# Patient Record
Sex: MALE | Race: WHITE | NOT HISPANIC OR LATINO | Employment: FULL TIME
[De-identification: names, ages, dates, MRNs, and addresses within clinical notes are randomized per-mention and may not be internally consistent; named-entity substitution may affect disease eponyms.]

---

## 2017-03-08 PROBLEM — I73.9 PVD (PERIPHERAL VASCULAR DISEASE) (HCC): Status: ACTIVE | Noted: 2017-03-08

## 2018-12-10 PROBLEM — I50.22 CHRONIC SYSTOLIC CHF (CONGESTIVE HEART FAILURE) (HCC): Status: ACTIVE | Noted: 2018-12-10

## 2018-12-10 PROBLEM — F10.20 ALCOHOLISM (HCC): Status: ACTIVE | Noted: 2018-12-10

## 2018-12-10 PROBLEM — I27.20 PULMONARY HTN (HCC): Status: ACTIVE | Noted: 2018-12-10

## 2018-12-10 PROBLEM — I48.0 PAF (PAROXYSMAL ATRIAL FIBRILLATION) (HCC): Status: ACTIVE | Noted: 2018-12-10

## 2018-12-10 PROBLEM — J44.9 COPD (CHRONIC OBSTRUCTIVE PULMONARY DISEASE) (HCC): Status: ACTIVE | Noted: 2018-12-10

## 2018-12-10 PROBLEM — Z72.0 TOBACCO ABUSE: Status: ACTIVE | Noted: 2018-12-10

## 2018-12-10 PROBLEM — E78.00 PURE HYPERCHOLESTEROLEMIA: Status: ACTIVE | Noted: 2018-12-10

## 2018-12-10 PROBLEM — I25.10 CORONARY ARTERY DISEASE INVOLVING NATIVE CORONARY ARTERY OF NATIVE HEART WITHOUT ANGINA PECTORIS: Status: ACTIVE | Noted: 2018-12-10

## 2018-12-10 PROBLEM — I10 BENIGN ESSENTIAL HTN: Status: ACTIVE | Noted: 2018-12-10

## 2018-12-10 PROBLEM — I77.9 BILATERAL CAROTID ARTERY DISEASE (HCC): Status: ACTIVE | Noted: 2018-12-10

## 2018-12-28 PROCEDURE — 82043 UR ALBUMIN QUANTITATIVE: CPT | Performed by: FAMILY MEDICINE

## 2018-12-28 PROCEDURE — 82570 ASSAY OF URINE CREATININE: CPT | Performed by: FAMILY MEDICINE

## 2019-04-25 PROBLEM — R94.31 ABNORMAL EKG: Status: ACTIVE | Noted: 2019-04-25

## 2019-04-25 PROBLEM — R06.00 DYSPNEA ON EXERTION: Status: ACTIVE | Noted: 2019-04-25

## 2019-05-17 ENCOUNTER — DIAGNOSTIC TRANS (OUTPATIENT)
Dept: OTHER | Age: 64
End: 2019-05-17

## 2019-05-17 ENCOUNTER — HOSPITAL (OUTPATIENT)
Dept: OTHER | Age: 64
End: 2019-05-17
Attending: HOSPITALIST

## 2019-05-17 LAB
ANALYZER ANC (IANC): ABNORMAL
ANION GAP SERPL CALC-SCNC: 12 MMOL/L (ref 10–20)
APTT PPP: 30 SECONDS (ref 22–32)
APTT PPP: NORMAL S
BASOPHILS # BLD: 0.1 THOUSAND/MCL (ref 0–0.3)
BASOPHILS NFR BLD: 1 %
BUN SERPL-MCNC: 22 MG/DL (ref 6–20)
BUN/CREAT SERPL: 20 (ref 7–25)
CALCIUM SERPL-MCNC: 9.3 MG/DL (ref 8.4–10.2)
CHLORIDE: 102 MMOL/L (ref 98–107)
CO2 SERPL-SCNC: 27 MMOL/L (ref 21–32)
CREAT SERPL-MCNC: 1.1 MG/DL (ref 0.67–1.17)
D DIMER PPP FEU-MCNC: 2.03 MG/L FEU
DIFFERENTIAL METHOD BLD: ABNORMAL
EOSINOPHIL # BLD: 0 THOUSAND/MCL (ref 0.1–0.5)
EOSINOPHIL NFR BLD: 0 %
ERYTHROCYTE [DISTWIDTH] IN BLOOD: 18.4 % (ref 11–15)
GLUCOSE SERPL-MCNC: 93 MG/DL (ref 65–99)
HEMATOCRIT: 53.2 % (ref 39–51)
HGB BLD-MCNC: 16.9 GM/DL (ref 13–17)
IMM GRANULOCYTES # BLD AUTO: 0 THOUSAND/MCL (ref 0–0.2)
IMM GRANULOCYTES NFR BLD: 0 %
INR PPP: 1.4
LYMPHOCYTES # BLD: 1.1 THOUSAND/MCL (ref 1–4)
LYMPHOCYTES NFR BLD: 21 %
MAGNESIUM SERPL-MCNC: 2.2 MG/DL (ref 1.7–2.4)
MCH RBC QN AUTO: 28.6 PG (ref 26–34)
MCHC RBC AUTO-ENTMCNC: 31.8 GM/DL (ref 32–36.5)
MCV RBC AUTO: 90 FL (ref 78–100)
MONOCYTES # BLD: 0.8 THOUSAND/MCL (ref 0.3–0.9)
MONOCYTES NFR BLD: 15 %
NEUTROPHILS # BLD: 3.3 THOUSAND/MCL (ref 1.8–7.7)
NEUTROPHILS NFR BLD: 63 %
NEUTS SEG NFR BLD: ABNORMAL %
NRBC (NRBCRE): 0 /100 WBC
NT-PROBNP SERPL-MCNC: 8032 PG/ML
PLATELET # BLD: 155 THOUSAND/MCL (ref 140–450)
POTASSIUM SERPL-SCNC: 4.4 MMOL/L (ref 3.4–5.1)
PROTHROMBIN TIME: 14.5 SECONDS (ref 9.7–11.8)
PROTHROMBIN TIME: ABNORMAL
RBC # BLD: 5.91 MILLION/MCL (ref 4.5–5.9)
SODIUM SERPL-SCNC: 137 MMOL/L (ref 135–145)
TROPONIN I SERPL HS-MCNC: 0.1 NG/ML
WBC # BLD: 5.3 THOUSAND/MCL (ref 4.2–11)

## 2019-05-18 ENCOUNTER — HOSPITAL (OUTPATIENT)
Dept: OTHER | Age: 64
End: 2019-05-18

## 2019-05-18 LAB
ANION GAP SERPL CALC-SCNC: 10 MMOL/L (ref 10–20)
BUN SERPL-MCNC: 27 MG/DL (ref 6–20)
BUN/CREAT SERPL: 22 (ref 7–25)
CALCIUM SERPL-MCNC: 9 MG/DL (ref 8.4–10.2)
CHLORIDE: 99 MMOL/L (ref 98–107)
CO2 SERPL-SCNC: 29 MMOL/L (ref 21–32)
CREAT SERPL-MCNC: 1.21 MG/DL (ref 0.67–1.17)
GLUCOSE SERPL-MCNC: 131 MG/DL (ref 65–99)
POTASSIUM SERPL-SCNC: 4.4 MMOL/L (ref 3.4–5.1)
SODIUM SERPL-SCNC: 134 MMOL/L (ref 135–145)
TROPONIN I SERPL HS-MCNC: 0.07 NG/ML

## 2019-05-19 LAB
ANALYZER ANC (IANC): ABNORMAL
ANION GAP SERPL CALC-SCNC: 10 MMOL/L (ref 10–20)
BASOPHILS # BLD: 0.1 THOUSAND/MCL (ref 0–0.3)
BASOPHILS NFR BLD: 1 %
BUN SERPL-MCNC: 27 MG/DL (ref 6–20)
BUN/CREAT SERPL: 23 (ref 7–25)
CALCIUM SERPL-MCNC: 9.2 MG/DL (ref 8.4–10.2)
CHLORIDE: 101 MMOL/L (ref 98–107)
CO2 SERPL-SCNC: 30 MMOL/L (ref 21–32)
CREAT SERPL-MCNC: 1.19 MG/DL (ref 0.67–1.17)
DIFFERENTIAL METHOD BLD: ABNORMAL
EOSINOPHIL # BLD: 0.1 THOUSAND/MCL (ref 0.1–0.5)
EOSINOPHIL NFR BLD: 1 %
ERYTHROCYTE [DISTWIDTH] IN BLOOD: 18.1 % (ref 11–15)
GLUCOSE SERPL-MCNC: 104 MG/DL (ref 65–99)
HEMATOCRIT: 50.9 % (ref 39–51)
HGB BLD-MCNC: 16.9 GM/DL (ref 13–17)
IMM GRANULOCYTES # BLD AUTO: 0 THOUSAND/MCL (ref 0–0.2)
IMM GRANULOCYTES NFR BLD: 0 %
LYMPHOCYTES # BLD: 1.3 THOUSAND/MCL (ref 1–4)
LYMPHOCYTES NFR BLD: 17 %
MAGNESIUM SERPL-MCNC: 2.3 MG/DL (ref 1.7–2.4)
MCH RBC QN AUTO: 29.5 PG (ref 26–34)
MCHC RBC AUTO-ENTMCNC: 33.2 GM/DL (ref 32–36.5)
MCV RBC AUTO: 89 FL (ref 78–100)
MONOCYTES # BLD: 0.7 THOUSAND/MCL (ref 0.3–0.9)
MONOCYTES NFR BLD: 10 %
NEUTROPHILS # BLD: 5.5 THOUSAND/MCL (ref 1.8–7.7)
NEUTROPHILS NFR BLD: 71 %
NEUTS SEG NFR BLD: ABNORMAL %
NRBC (NRBCRE): 0 /100 WBC
PLATELET # BLD: 153 THOUSAND/MCL (ref 140–450)
POTASSIUM SERPL-SCNC: 3.6 MMOL/L (ref 3.4–5.1)
RBC # BLD: 5.72 MILLION/MCL (ref 4.5–5.9)
SODIUM SERPL-SCNC: 137 MMOL/L (ref 135–145)
WBC # BLD: 7.6 THOUSAND/MCL (ref 4.2–11)

## 2019-05-20 LAB
ANALYZER ANC (IANC): ABNORMAL
APTT PPP: 29 SECONDS (ref 22–32)
APTT PPP: NORMAL S
CREAT SERPL-MCNC: 1.02 MG/DL (ref 0.67–1.17)
ERYTHROCYTE [DISTWIDTH] IN BLOOD: 18.6 % (ref 11–15)
HEMATOCRIT: 52.3 % (ref 39–51)
HGB BLD-MCNC: 17.1 GM/DL (ref 13–17)
INR PPP: 1.4
MCH RBC QN AUTO: 28.7 PG (ref 26–34)
MCHC RBC AUTO-ENTMCNC: 32.7 GM/DL (ref 32–36.5)
MCV RBC AUTO: 87.9 FL (ref 78–100)
NRBC (NRBCRE): 0 /100 WBC
PLATELET # BLD: 119 THOUSAND/MCL (ref 140–450)
POTASSIUM SERPL-SCNC: 4.4 MMOL/L (ref 3.4–5.1)
PROTHROMBIN TIME: 14.2 SECONDS (ref 9.7–11.8)
PROTHROMBIN TIME: ABNORMAL
RBC # BLD: 5.95 MILLION/MCL (ref 4.5–5.9)
WBC # BLD: 5.8 THOUSAND/MCL (ref 4.2–11)

## 2019-05-21 LAB
ANALYZER ANC (IANC): ABNORMAL
ANION GAP SERPL CALC-SCNC: 10 MMOL/L (ref 10–20)
APTT PPP: 39 SECONDS (ref 22–32)
APTT PPP: 41 SECONDS (ref 22–32)
APTT PPP: ABNORMAL S
APTT PPP: ABNORMAL S
BUN SERPL-MCNC: 27 MG/DL (ref 6–20)
BUN/CREAT SERPL: 29 (ref 7–25)
CALCIUM SERPL-MCNC: 8.3 MG/DL (ref 8.4–10.2)
CHLORIDE: 99 MMOL/L (ref 98–107)
CO2 SERPL-SCNC: 31 MMOL/L (ref 21–32)
CREAT SERPL-MCNC: 0.93 MG/DL (ref 0.67–1.17)
CREAT SERPL-MCNC: 1.06 MG/DL (ref 0.67–1.17)
ERYTHROCYTE [DISTWIDTH] IN BLOOD: 17.8 % (ref 11–15)
GLUCOSE BLDC GLUCOMTR-MCNC: 100 MG/DL (ref 65–99)
GLUCOSE SERPL-MCNC: 132 MG/DL (ref 65–99)
HEMATOCRIT: 48.3 % (ref 39–51)
HGB BLD-MCNC: 16 GM/DL (ref 13–17)
MAGNESIUM SERPL-MCNC: 2.2 MG/DL (ref 1.7–2.4)
MCH RBC QN AUTO: 29 PG (ref 26–34)
MCHC RBC AUTO-ENTMCNC: 33.1 GM/DL (ref 32–36.5)
MCV RBC AUTO: 87.7 FL (ref 78–100)
NRBC (NRBCRE): 0 /100 WBC
PHOSPHATE SERPL-MCNC: 3.6 MG/DL (ref 2.4–4.7)
PLATELET # BLD: 120 THOUSAND/MCL (ref 140–450)
POTASSIUM SERPL-SCNC: 3.5 MMOL/L (ref 3.4–5.1)
POTASSIUM SERPL-SCNC: 3.5 MMOL/L (ref 3.4–5.1)
RBC # BLD: 5.51 MILLION/MCL (ref 4.5–5.9)
SODIUM SERPL-SCNC: 136 MMOL/L (ref 135–145)
WBC # BLD: 6.4 THOUSAND/MCL (ref 4.2–11)

## 2019-05-28 ENCOUNTER — HOSPITAL (OUTPATIENT)
Dept: OTHER | Age: 64
End: 2019-05-28

## 2019-07-09 PROBLEM — N18.30 CKD (CHRONIC KIDNEY DISEASE) STAGE 3, GFR 30-59 ML/MIN (HCC): Status: ACTIVE | Noted: 2019-07-09

## 2019-07-09 PROBLEM — I65.23 BILATERAL CAROTID ARTERY STENOSIS: Status: ACTIVE | Noted: 2019-07-09

## 2019-07-09 PROBLEM — Z95.810 ICD (IMPLANTABLE CARDIOVERTER-DEFIBRILLATOR) IN PLACE: Status: ACTIVE | Noted: 2019-07-09

## 2019-09-11 PROBLEM — Z91.19 NON-COMPLIANCE: Status: ACTIVE | Noted: 2019-09-11

## 2019-10-10 PROBLEM — Z91.19 NONCOMPLIANCE: Status: ACTIVE | Noted: 2019-09-11

## 2020-07-14 PROBLEM — I34.0 MR (MITRAL REGURGITATION): Status: ACTIVE | Noted: 2020-07-14

## 2020-07-14 PROBLEM — I13.10 CARDIORENAL SYNDROME: Status: ACTIVE | Noted: 2020-07-14

## 2020-08-06 LAB
BUN SERPL-MCNC: 16 MG/DL
CALCIUM SERPL-MCNC: 9.2 MG/DL
CHLORIDE SERPL-SCNC: 94 MMOL/L
CO2 SERPL-SCNC: 26.3 MMOL/L
CREAT SERPL-MCNC: 0.96 MG/DL
GLUCOSE SERPL-MCNC: 97 MG/DL
NT-PROBNP SERPL-MCNC: 4395 PG/ML
POTASSIUM SERPL-SCNC: 3.43 MMOL/L
SODIUM SERPL-SCNC: 134 MMOL/L

## 2020-08-27 ENCOUNTER — APPOINTMENT (OUTPATIENT)
Dept: CARDIOLOGY | Age: 65
End: 2020-08-27

## 2020-09-04 ENCOUNTER — APPOINTMENT (OUTPATIENT)
Dept: GENERAL RADIOLOGY | Age: 65
DRG: 071 | End: 2020-09-04
Attending: EMERGENCY MEDICINE

## 2020-09-04 ENCOUNTER — HOSPITAL ENCOUNTER (INPATIENT)
Age: 65
LOS: 6 days | Discharge: NURSING, REHAB OR LONG-TERM CARE | DRG: 071 | End: 2020-09-11
Attending: EMERGENCY MEDICINE | Admitting: HOSPITALIST

## 2020-09-04 DIAGNOSIS — I95.9 HYPOTENSION, UNSPECIFIED HYPOTENSION TYPE: ICD-10-CM

## 2020-09-04 DIAGNOSIS — E87.20 LACTIC ACIDOSIS: ICD-10-CM

## 2020-09-04 DIAGNOSIS — R53.1 WEAKNESS: ICD-10-CM

## 2020-09-04 DIAGNOSIS — F29 PSYCHOSIS, UNSPECIFIED PSYCHOSIS TYPE (CMD): Primary | ICD-10-CM

## 2020-09-04 LAB
ALBUMIN SERPL-MCNC: 2.2 G/DL (ref 3.6–5.1)
ALBUMIN/GLOB SERPL: 0.4 {RATIO} (ref 1–2.4)
ALP SERPL-CCNC: 167 UNITS/L (ref 45–117)
ALT SERPL-CCNC: 29 UNITS/L
ANION GAP SERPL CALC-SCNC: 10 MMOL/L (ref 10–20)
APPEARANCE UR: CLEAR
AST SERPL-CCNC: 33 UNITS/L
BACTERIA #/AREA URNS HPF: ABNORMAL /HPF
BASOPHILS # BLD: 0 K/MCL (ref 0–0.3)
BASOPHILS NFR BLD: 1 %
BILIRUB SERPL-MCNC: 2.3 MG/DL (ref 0.2–1)
BILIRUB UR QL STRIP: NEGATIVE
BUN SERPL-MCNC: 13 MG/DL (ref 6–20)
BUN/CREAT SERPL: 14 (ref 7–25)
CALCIUM SERPL-MCNC: 8.8 MG/DL (ref 8.4–10.2)
CHLORIDE SERPL-SCNC: 100 MMOL/L (ref 98–107)
CO2 SERPL-SCNC: 30 MMOL/L (ref 21–32)
COLOR UR: ABNORMAL
CREAT SERPL-MCNC: 0.96 MG/DL (ref 0.67–1.17)
DIFFERENTIAL METHOD BLD: ABNORMAL
EOSINOPHIL # BLD: 0 K/MCL (ref 0.1–0.5)
EOSINOPHIL NFR BLD: 1 %
ERYTHROCYTE [DISTWIDTH] IN BLOOD: 19.1 % (ref 11–15)
GLOBULIN SER-MCNC: 5 G/DL (ref 2–4)
GLUCOSE SERPL-MCNC: 87 MG/DL (ref 65–99)
GLUCOSE UR STRIP-MCNC: NEGATIVE MG/DL
HCT VFR BLD CALC: 50.4 % (ref 39–51)
HGB BLD-MCNC: 16.1 G/DL (ref 13–17)
HGB UR QL STRIP: NEGATIVE
HYALINE CASTS #/AREA URNS LPF: ABNORMAL /LPF (ref 0–5)
IMM GRANULOCYTES # BLD AUTO: 0 K/MCL (ref 0–0.2)
IMM GRANULOCYTES NFR BLD: 1 %
KETONES UR STRIP-MCNC: NEGATIVE MG/DL
LACTATE BLDV-MCNC: 2.4 MMOL/L
LEUKOCYTE ESTERASE UR QL STRIP: NEGATIVE
LYMPHOCYTES # BLD: 0.6 K/MCL (ref 1–4)
LYMPHOCYTES NFR BLD: 12 %
MCH RBC QN AUTO: 29.8 PG (ref 26–34)
MCHC RBC AUTO-ENTMCNC: 31.9 G/DL (ref 32–36.5)
MCV RBC AUTO: 93.3 FL (ref 78–100)
MONOCYTES # BLD: 0.7 K/MCL (ref 0.3–0.9)
MONOCYTES NFR BLD: 13 %
MUCOUS THREADS URNS QL MICRO: PRESENT
NEUTROPHILS # BLD: 4 K/MCL (ref 1.8–7.7)
NEUTROPHILS NFR BLD: 72 %
NITRITE UR QL STRIP: NEGATIVE
NRBC BLD MANUAL-RTO: 0 /100 WBC
PH UR STRIP: 6 UNITS (ref 5–7)
PLATELET # BLD: 174 K/MCL (ref 140–450)
POTASSIUM SERPL-SCNC: 4.5 MMOL/L (ref 3.4–5.1)
PROCALCITONIN SERPL IA-MCNC: <0.05 NG/ML
PROT SERPL-MCNC: 7.2 G/DL (ref 6.4–8.2)
PROT UR STRIP-MCNC: 30 MG/DL
RBC # BLD: 5.4 MIL/MCL (ref 4.5–5.9)
RBC #/AREA URNS HPF: ABNORMAL /HPF (ref 0–2)
SODIUM SERPL-SCNC: 135 MMOL/L (ref 135–145)
SP GR UR STRIP: 1.02 (ref 1–1.03)
SPECIMEN SOURCE: ABNORMAL
SQUAMOUS #/AREA URNS HPF: ABNORMAL /HPF (ref 0–5)
TRANS CELLS #/AREA URNS HPF: ABNORMAL /HPF
TROPONIN I SERPL HS-MCNC: 0.04 NG/ML
UROBILINOGEN UR STRIP-MCNC: 4 MG/DL (ref 0–1)
WBC # BLD: 5.4 K/MCL (ref 4.2–11)
WBC #/AREA URNS HPF: ABNORMAL /HPF (ref 0–5)

## 2020-09-04 PROCEDURE — 81001 URINALYSIS AUTO W/SCOPE: CPT

## 2020-09-04 PROCEDURE — 84145 PROCALCITONIN (PCT): CPT

## 2020-09-04 PROCEDURE — 87077 CULTURE AEROBIC IDENTIFY: CPT

## 2020-09-04 PROCEDURE — 85025 COMPLETE CBC W/AUTO DIFF WBC: CPT

## 2020-09-04 PROCEDURE — 87040 BLOOD CULTURE FOR BACTERIA: CPT

## 2020-09-04 PROCEDURE — 83605 ASSAY OF LACTIC ACID: CPT

## 2020-09-04 PROCEDURE — 10002807 HB RX 258: Performed by: EMERGENCY MEDICINE

## 2020-09-04 PROCEDURE — 80053 COMPREHEN METABOLIC PANEL: CPT

## 2020-09-04 PROCEDURE — G0378 HOSPITAL OBSERVATION PER HR: HCPCS

## 2020-09-04 PROCEDURE — 87149 DNA/RNA DIRECT PROBE: CPT

## 2020-09-04 PROCEDURE — 84484 ASSAY OF TROPONIN QUANT: CPT

## 2020-09-04 PROCEDURE — 71045 X-RAY EXAM CHEST 1 VIEW: CPT

## 2020-09-04 PROCEDURE — 99285 EMERGENCY DEPT VISIT HI MDM: CPT

## 2020-09-04 PROCEDURE — 93005 ELECTROCARDIOGRAM TRACING: CPT | Performed by: EMERGENCY MEDICINE

## 2020-09-04 RX ORDER — 0.9 % SODIUM CHLORIDE 0.9 %
2 VIAL (ML) INJECTION EVERY 12 HOURS SCHEDULED
Status: DISCONTINUED | OUTPATIENT
Start: 2020-09-05 | End: 2020-09-11 | Stop reason: HOSPADM

## 2020-09-04 RX ORDER — ASPIRIN 81 MG/1
81 TABLET, CHEWABLE ORAL DAILY
Status: DISCONTINUED | OUTPATIENT
Start: 2020-09-05 | End: 2020-09-11 | Stop reason: HOSPADM

## 2020-09-04 RX ORDER — BUPROPION HYDROCHLORIDE 150 MG/1
150 TABLET ORAL DAILY
Status: DISCONTINUED | OUTPATIENT
Start: 2020-09-05 | End: 2020-09-05

## 2020-09-04 RX ORDER — ALBUTEROL SULFATE 2.5 MG/3ML
2.5 SOLUTION RESPIRATORY (INHALATION) PRN
COMMUNITY
Start: 2020-09-04

## 2020-09-04 RX ORDER — ATORVASTATIN CALCIUM 80 MG/1
80 TABLET, FILM COATED ORAL NIGHTLY
Status: DISCONTINUED | OUTPATIENT
Start: 2020-09-05 | End: 2020-09-11 | Stop reason: HOSPADM

## 2020-09-04 RX ORDER — SERTRALINE HYDROCHLORIDE 100 MG/1
100 TABLET, FILM COATED ORAL DAILY
Status: DISCONTINUED | OUTPATIENT
Start: 2020-09-05 | End: 2020-09-06

## 2020-09-04 RX ORDER — ALBUTEROL SULFATE 2.5 MG/3ML
2.5 SOLUTION RESPIRATORY (INHALATION)
Status: DISCONTINUED | OUTPATIENT
Start: 2020-09-04 | End: 2020-09-11 | Stop reason: HOSPADM

## 2020-09-04 RX ORDER — ATORVASTATIN CALCIUM 40 MG/1
80 TABLET, FILM COATED ORAL AT BEDTIME
COMMUNITY

## 2020-09-04 RX ADMIN — SODIUM CHLORIDE 250 ML: 0.9 INJECTION, SOLUTION INTRAVENOUS at 19:24

## 2020-09-04 ASSESSMENT — COLUMBIA-SUICIDE SEVERITY RATING SCALE - C-SSRS: IS THE PATIENT ABLE TO COMPLETE C-SSRS: NO, DEFER TO LATER TIME

## 2020-09-04 ASSESSMENT — COGNITIVE AND FUNCTIONAL STATUS - GENERAL
DO YOU HAVE SERIOUS DIFFICULTY WALKING OR CLIMBING STAIRS: YES
ARE YOU BLIND OR DO YOU HAVE SERIOUS DIFFICULTY SEEING, EVEN WHEN WEARING GLASSES: NO
BECAUSE OF A PHYSICAL, MENTAL, OR EMOTIONAL CONDITION, DO YOU HAVE SERIOUS DIFFICULTY CONCENTRATING, REMEMBERING OR MAKING DECISIONS: YES
DO YOU HAVE DIFFICULTY DRESSING OR BATHING: YES
BECAUSE OF A PHYSICAL, MENTAL, OR EMOTIONAL CONDITION, DO YOU HAVE DIFFICULTY DOING ERRANDS ALONE: YES
ARE YOU DEAF OR DO YOU HAVE SERIOUS DIFFICULTY  HEARING: NO

## 2020-09-04 ASSESSMENT — ENCOUNTER SYMPTOMS
CHILLS: 0
BACK PAIN: 0
HEADACHES: 0
FEVER: 0
ABDOMINAL PAIN: 0
BRUISES/BLEEDS EASILY: 0
WHEEZING: 0
VOMITING: 0
SHORTNESS OF BREATH: 0
DIZZINESS: 1

## 2020-09-04 ASSESSMENT — LIFESTYLE VARIABLES
AGITATION: NORMAL ACTIVITY
AUDITORY DISTURBANCES: NOT PRESENT
AGITATION: NORMAL ACTIVITY
VISUAL DISTURBANCES: NOT PRESENT
VISUAL DISTURBANCES: NOT PRESENT
ANXIETY: NO ANXIETY, AT EASE
HEADACHE, FULLNESS IN HEAD: NOT PRESENT
HOW OFTEN DO YOU HAVE A DRINK CONTAINING ALCOHOL: 2 TO 3 TIMES PER WEEK
TACTILE DISTURBANCES: NOT PRESENT
TREMOR: 2
NAUSEA AND VOMITING: NO NAUSEA AND NO VOMITING
TACTILE DISTURBANCES: NOT PRESENT
TREMOR: 2
HOW MANY STANDARD DRINKS CONTAINING ALCOHOL DO YOU HAVE ON A TYPICAL DAY: 0,1 OR 2
HOW OFTEN DO YOU HAVE 6 OR MORE DRINKS ON ONE OCCASION: NEVER
AUDITORY DISTURBANCES: NOT PRESENT
ALCOHOL_USE_STATUS: NO OR LOW RISK WITH VALIDATED TOOL
NAUSEA AND VOMITING: NO NAUSEA AND NO VOMITING
HEADACHE, FULLNESS IN HEAD: NOT PRESENT
ANXIETY: NO ANXIETY, AT EASE
PAROXYSMAL SWEATS: NO SWEAT VISIBLE
PAROXYSMAL SWEATS: NO SWEAT VISIBLE
AUDIT-C TOTAL SCORE: 3

## 2020-09-04 ASSESSMENT — MOVEMENT AND STRENGTH ASSESSMENTS
LLE: EQUAL STRENGTH/TONE/MOVEMENT
RLE: EQUAL STRENGTH/TONE/MOVEMENT
RUE: EQUAL STRENGTH/TONE/MOVEMENT
FACE_JAW: FACE SYMMETRICAL;TONGUE MIDLINE;FULL RANGE OF MOTION
HEAD_NECK: FULL RANGE OF MOTION
LUE: EQUAL STRENGTH/TONE/MOVEMENT

## 2020-09-04 ASSESSMENT — ACTIVITIES OF DAILY LIVING (ADL)
CHRONIC_PAIN_PRESENT: NO
ADL_SHORT_OF_BREATH: YES
RECENT_DECLINE_ADL: YES, DECLINE IN BATHING/DRESSING/FEEDING, COLLABORATE WITH PROVIDER (T)
MOBILITY_ASSIST_DEVICES: WHEELCHAIR;FRONT-WHEELED WALKER

## 2020-09-04 ASSESSMENT — PAIN SCALES - GENERAL
PAINLEVEL_OUTOF10: 0
PAINLEVEL_OUTOF10: 0

## 2020-09-04 ASSESSMENT — PATIENT HEALTH QUESTIONNAIRE - PHQ9: IS PATIENT ABLE TO COMPLETE PHQ2 OR PHQ9: NO, DEFER TO LATER TIME

## 2020-09-05 LAB
ALBUMIN SERPL-MCNC: 2.3 G/DL (ref 3.6–5.1)
ALP SERPL-CCNC: 166 UNITS/L (ref 45–117)
ALT SERPL-CCNC: 25 UNITS/L
ANION GAP SERPL CALC-SCNC: 9 MMOL/L (ref 10–20)
AST SERPL-CCNC: 31 UNITS/L
ATRIAL RATE (BPM): 88
BASOPHILS # BLD: 0 K/MCL (ref 0–0.3)
BASOPHILS NFR BLD: 1 %
BILIRUB CONJ SERPL-MCNC: 1.5 MG/DL (ref 0–0.2)
BILIRUB SERPL-MCNC: 2.3 MG/DL (ref 0.2–1)
BUN SERPL-MCNC: 14 MG/DL (ref 6–20)
BUN/CREAT SERPL: 14 (ref 7–25)
CALCIUM SERPL-MCNC: 8.7 MG/DL (ref 8.4–10.2)
CHLORIDE SERPL-SCNC: 101 MMOL/L (ref 98–107)
CO2 SERPL-SCNC: 28 MMOL/L (ref 21–32)
CREAT SERPL-MCNC: 0.98 MG/DL (ref 0.67–1.17)
DIFFERENTIAL METHOD BLD: ABNORMAL
EOSINOPHIL # BLD: 0 K/MCL (ref 0.1–0.5)
EOSINOPHIL NFR BLD: 1 %
ERYTHROCYTE [DISTWIDTH] IN BLOOD: 18.8 % (ref 11–15)
GLUCOSE SERPL-MCNC: 83 MG/DL (ref 65–99)
HCT VFR BLD CALC: 49.4 % (ref 39–51)
HGB BLD-MCNC: 15.8 G/DL (ref 13–17)
IMM GRANULOCYTES # BLD AUTO: 0 K/MCL (ref 0–0.2)
IMM GRANULOCYTES NFR BLD: 0 %
LACTATE BLDV-SCNC: 2 MMOL/L (ref 0–2)
LYMPHOCYTES # BLD: 0.9 K/MCL (ref 1–4)
LYMPHOCYTES NFR BLD: 20 %
MAGNESIUM SERPL-MCNC: 2.4 MG/DL (ref 1.7–2.4)
MCH RBC QN AUTO: 29.7 PG (ref 26–34)
MCHC RBC AUTO-ENTMCNC: 32 G/DL (ref 32–36.5)
MCV RBC AUTO: 92.9 FL (ref 78–100)
MONOCYTES # BLD: 0.7 K/MCL (ref 0.3–0.9)
MONOCYTES NFR BLD: 15 %
NEUTROPHILS # BLD: 2.9 K/MCL (ref 1.8–7.7)
NEUTROPHILS NFR BLD: 63 %
NRBC BLD MANUAL-RTO: 0 /100 WBC
PLATELET # BLD: 156 K/MCL (ref 140–450)
POTASSIUM SERPL-SCNC: 4.1 MMOL/L (ref 3.4–5.1)
PROT SERPL-MCNC: 7.3 G/DL (ref 6.4–8.2)
QRS-INTERVAL (MSEC): 122
QT-INTERVAL (MSEC): 424
QTC: 444
R AXIS (DEGREES): 92
RBC # BLD: 5.32 MIL/MCL (ref 4.5–5.9)
REPORT TEXT: NORMAL
SODIUM SERPL-SCNC: 134 MMOL/L (ref 135–145)
T AXIS (DEGREES): -58
VENTRICULAR RATE EKG/MIN (BPM): 66
WBC # BLD: 4.7 K/MCL (ref 4.2–11)

## 2020-09-05 PROCEDURE — 10004281 HB COUNTER-STAFF TIME PER 15 MIN

## 2020-09-05 PROCEDURE — 97162 PT EVAL MOD COMPLEX 30 MIN: CPT

## 2020-09-05 PROCEDURE — 80048 BASIC METABOLIC PNL TOTAL CA: CPT

## 2020-09-05 PROCEDURE — 10004651 HB RX, NO CHARGE ITEM: Performed by: HOSPITALIST

## 2020-09-05 PROCEDURE — 10002807 HB RX 258: Performed by: INTERNAL MEDICINE

## 2020-09-05 PROCEDURE — 10002800 HB RX 250 W HCPCS: Performed by: INTERNAL MEDICINE

## 2020-09-05 PROCEDURE — 10002801 HB RX 250 W/O HCPCS: Performed by: INTERNAL MEDICINE

## 2020-09-05 PROCEDURE — 83735 ASSAY OF MAGNESIUM: CPT

## 2020-09-05 PROCEDURE — G0378 HOSPITAL OBSERVATION PER HR: HCPCS

## 2020-09-05 PROCEDURE — 97530 THERAPEUTIC ACTIVITIES: CPT

## 2020-09-05 PROCEDURE — 10006031 HB ROOM CHARGE TELEMETRY

## 2020-09-05 PROCEDURE — 80076 HEPATIC FUNCTION PANEL: CPT

## 2020-09-05 PROCEDURE — 83605 ASSAY OF LACTIC ACID: CPT

## 2020-09-05 PROCEDURE — 85025 COMPLETE CBC W/AUTO DIFF WBC: CPT

## 2020-09-05 PROCEDURE — 10002803 HB RX 637: Performed by: HOSPITALIST

## 2020-09-05 PROCEDURE — 99253 IP/OBS CNSLTJ NEW/EST LOW 45: CPT | Performed by: PSYCHIATRY & NEUROLOGY

## 2020-09-05 PROCEDURE — 10002803 HB RX 637: Performed by: PSYCHIATRY & NEUROLOGY

## 2020-09-05 PROCEDURE — 10002803 HB RX 637: Performed by: INTERNAL MEDICINE

## 2020-09-05 PROCEDURE — 36415 COLL VENOUS BLD VENIPUNCTURE: CPT

## 2020-09-05 PROCEDURE — 96374 THER/PROPH/DIAG INJ IV PUSH: CPT

## 2020-09-05 RX ORDER — HALOPERIDOL 5 MG/ML
2 INJECTION INTRAMUSCULAR ONCE
Status: COMPLETED | OUTPATIENT
Start: 2020-09-05 | End: 2020-09-06

## 2020-09-05 RX ORDER — HALOPERIDOL 1 MG/1
2 TABLET ORAL ONCE
Status: COMPLETED | OUTPATIENT
Start: 2020-09-05 | End: 2020-09-05

## 2020-09-05 RX ORDER — NICOTINE 21 MG/24HR
1 PATCH, TRANSDERMAL 24 HOURS TRANSDERMAL DAILY
Status: DISCONTINUED | OUTPATIENT
Start: 2020-09-05 | End: 2020-09-11 | Stop reason: HOSPADM

## 2020-09-05 RX ORDER — OLANZAPINE 5 MG/1
5 TABLET ORAL NIGHTLY
Status: DISCONTINUED | OUTPATIENT
Start: 2020-09-05 | End: 2020-09-11 | Stop reason: HOSPADM

## 2020-09-05 RX ADMIN — SODIUM CHLORIDE, PRESERVATIVE FREE 2 ML: 5 INJECTION INTRAVENOUS at 20:30

## 2020-09-05 RX ADMIN — BUPROPION HYDROCHLORIDE 150 MG: 150 TABLET, FILM COATED, EXTENDED RELEASE ORAL at 10:00

## 2020-09-05 RX ADMIN — ASPIRIN 81 MG: 81 TABLET, CHEWABLE ORAL at 10:00

## 2020-09-05 RX ADMIN — SODIUM CHLORIDE, PRESERVATIVE FREE 2 ML: 5 INJECTION INTRAVENOUS at 10:00

## 2020-09-05 RX ADMIN — APIXABAN 5 MG: 5 TABLET, FILM COATED ORAL at 10:00

## 2020-09-05 RX ADMIN — OLANZAPINE 5 MG: 10 TABLET, FILM COATED ORAL at 20:26

## 2020-09-05 RX ADMIN — SERTRALINE HYDROCHLORIDE 100 MG: 50 TABLET, FILM COATED ORAL at 10:00

## 2020-09-05 RX ADMIN — NICOTINE 1 PATCH: 14 PATCH TRANSDERMAL at 12:34

## 2020-09-05 RX ADMIN — DESMOPRESSIN ACETATE 80 MG: 0.2 TABLET ORAL at 20:30

## 2020-09-05 RX ADMIN — APIXABAN 5 MG: 5 TABLET, FILM COATED ORAL at 20:26

## 2020-09-05 RX ADMIN — HALOPERIDOL 2 MG: 1 TABLET ORAL at 16:57

## 2020-09-05 RX ADMIN — THIAMINE HYDROCHLORIDE 400 MG: 100 INJECTION, SOLUTION INTRAMUSCULAR; INTRAVENOUS at 20:29

## 2020-09-05 RX ADMIN — VANCOMYCIN HYDROCHLORIDE 1750 MG: 10 INJECTION, POWDER, LYOPHILIZED, FOR SOLUTION INTRAVENOUS at 23:21

## 2020-09-05 RX ADMIN — THIAMINE HYDROCHLORIDE 500 MG: 100 INJECTION, SOLUTION INTRAMUSCULAR; INTRAVENOUS at 12:43

## 2020-09-05 ASSESSMENT — PULMONARY FUNCTION TESTS
FEV1_PREDICTED: 0
FEV1: 0
FEV1_PREDICTED: 0
FEV1: 0
FEV1: 0
FEV1/FVC: UNABLE TO OBTAIN, OR GREATER THAN 70%

## 2020-09-05 ASSESSMENT — ACTIVITIES OF DAILY LIVING (ADL)
TOILETING: NEEDS ASSISTANCE
BATHING: NEEDS ASSISTANCE
DRESSING YOURSELF: NEEDS ASSISTANCE
ADL_BEFORE_ADMISSION: NEEDS/REQUIRES ASSISTANCE
ADL_SCORE: 6
TRANSFERRING: NEEDS ASSISTANCE
CONTINENCE: NEEDS ASSISTANCE
FEEDING YOURSELF: NEEDS ASSISTANCE
PRIOR_ADL: INDEPENDENT

## 2020-09-05 ASSESSMENT — COGNITIVE AND FUNCTIONAL STATUS - GENERAL
BASIC_MOBILITY_RAW_SCORE: 11
BASIC_MOBILITY_CONVERTED_SCORE: 30.25

## 2020-09-05 ASSESSMENT — PAIN SCALES - GENERAL
PAINLEVEL_OUTOF10: 0
PAINLEVEL_OUTOF10: 0

## 2020-09-06 LAB
ALBUMIN SERPL-MCNC: 2.2 G/DL (ref 3.6–5.1)
ALBUMIN/GLOB SERPL: 0.5 {RATIO} (ref 1–2.4)
ALP SERPL-CCNC: 164 UNITS/L (ref 45–117)
ALT SERPL-CCNC: 25 UNITS/L
AMMONIA PLAS-SCNC: 27 MCMOL/L
ANION GAP SERPL CALC-SCNC: 10 MMOL/L (ref 10–20)
AST SERPL-CCNC: 28 UNITS/L
BASOPHILS # BLD: 0 K/MCL (ref 0–0.3)
BASOPHILS NFR BLD: 1 %
BILIRUB SERPL-MCNC: 2 MG/DL (ref 0.2–1)
BUN SERPL-MCNC: 16 MG/DL (ref 6–20)
BUN/CREAT SERPL: 16 (ref 7–25)
CALCIUM SERPL-MCNC: 8.6 MG/DL (ref 8.4–10.2)
CHLORIDE SERPL-SCNC: 102 MMOL/L (ref 98–107)
CO2 SERPL-SCNC: 29 MMOL/L (ref 21–32)
CREAT SERPL-MCNC: 1.02 MG/DL (ref 0.67–1.17)
DIFFERENTIAL METHOD BLD: ABNORMAL
EOSINOPHIL # BLD: 0 K/MCL (ref 0.1–0.5)
EOSINOPHIL NFR BLD: 1 %
ERYTHROCYTE [DISTWIDTH] IN BLOOD: 18.7 % (ref 11–15)
GLOBULIN SER-MCNC: 4.3 G/DL (ref 2–4)
GLUCOSE SERPL-MCNC: 95 MG/DL (ref 65–99)
HCT VFR BLD CALC: 45.3 % (ref 39–51)
HGB BLD-MCNC: 14.7 G/DL (ref 13–17)
IMM GRANULOCYTES # BLD AUTO: 0 K/MCL (ref 0–0.2)
IMM GRANULOCYTES NFR BLD: 1 %
LYMPHOCYTES # BLD: 0.6 K/MCL (ref 1–4)
LYMPHOCYTES NFR BLD: 10 %
MAGNESIUM SERPL-MCNC: 2.3 MG/DL (ref 1.7–2.4)
MCH RBC QN AUTO: 29.9 PG (ref 26–34)
MCHC RBC AUTO-ENTMCNC: 32.5 G/DL (ref 32–36.5)
MCV RBC AUTO: 92.1 FL (ref 78–100)
MONOCYTES # BLD: 0.8 K/MCL (ref 0.3–0.9)
MONOCYTES NFR BLD: 14 %
NEUTROPHILS # BLD: 4.5 K/MCL (ref 1.8–7.7)
NEUTROPHILS NFR BLD: 73 %
NRBC BLD MANUAL-RTO: 0 /100 WBC
PLATELET # BLD: 126 K/MCL (ref 140–450)
POTASSIUM SERPL-SCNC: 4 MMOL/L (ref 3.4–5.1)
PROT SERPL-MCNC: 6.5 G/DL (ref 6.4–8.2)
RBC # BLD: 4.92 MIL/MCL (ref 4.5–5.9)
SODIUM SERPL-SCNC: 137 MMOL/L (ref 135–145)
TSH SERPL-ACNC: 6.79 MCUNITS/ML (ref 0.35–5)
WBC # BLD: 6 K/MCL (ref 4.2–11)

## 2020-09-06 PROCEDURE — 10002807 HB RX 258: Performed by: INTERNAL MEDICINE

## 2020-09-06 PROCEDURE — 10002803 HB RX 637: Performed by: INTERNAL MEDICINE

## 2020-09-06 PROCEDURE — 84443 ASSAY THYROID STIM HORMONE: CPT

## 2020-09-06 PROCEDURE — 10002803 HB RX 637: Performed by: PSYCHIATRY & NEUROLOGY

## 2020-09-06 PROCEDURE — 10004651 HB RX, NO CHARGE ITEM: Performed by: HOSPITALIST

## 2020-09-06 PROCEDURE — 82607 VITAMIN B-12: CPT

## 2020-09-06 PROCEDURE — 87040 BLOOD CULTURE FOR BACTERIA: CPT

## 2020-09-06 PROCEDURE — 10002800 HB RX 250 W HCPCS: Performed by: INTERNAL MEDICINE

## 2020-09-06 PROCEDURE — 10002803 HB RX 637: Performed by: HOSPITALIST

## 2020-09-06 PROCEDURE — 85025 COMPLETE CBC W/AUTO DIFF WBC: CPT

## 2020-09-06 PROCEDURE — 99232 SBSQ HOSP IP/OBS MODERATE 35: CPT | Performed by: PSYCHIATRY & NEUROLOGY

## 2020-09-06 PROCEDURE — 36415 COLL VENOUS BLD VENIPUNCTURE: CPT

## 2020-09-06 PROCEDURE — 83735 ASSAY OF MAGNESIUM: CPT

## 2020-09-06 PROCEDURE — 82140 ASSAY OF AMMONIA: CPT

## 2020-09-06 PROCEDURE — 10003585 HB ROOM CHARGE INTERMEDIATE CARE

## 2020-09-06 PROCEDURE — 80053 COMPREHEN METABOLIC PANEL: CPT

## 2020-09-06 RX ORDER — CALCIUM CARBONATE 500 MG/1
500 TABLET, CHEWABLE ORAL EVERY 4 HOURS PRN
Status: DISCONTINUED | OUTPATIENT
Start: 2020-09-06 | End: 2020-09-11 | Stop reason: HOSPADM

## 2020-09-06 RX ORDER — MAGNESIUM HYDROXIDE/ALUMINUM HYDROXICE/SIMETHICONE 120; 1200; 1200 MG/30ML; MG/30ML; MG/30ML
20 SUSPENSION ORAL EVERY 4 HOURS PRN
Status: DISCONTINUED | OUTPATIENT
Start: 2020-09-06 | End: 2020-09-11 | Stop reason: HOSPADM

## 2020-09-06 RX ORDER — FAMOTIDINE 20 MG/1
20 TABLET, FILM COATED ORAL EVERY 12 HOURS SCHEDULED
Status: DISCONTINUED | OUTPATIENT
Start: 2020-09-07 | End: 2020-09-11 | Stop reason: HOSPADM

## 2020-09-06 RX ORDER — HALOPERIDOL 5 MG/ML
2 INJECTION INTRAMUSCULAR EVERY 6 HOURS PRN
Status: DISCONTINUED | OUTPATIENT
Start: 2020-09-06 | End: 2020-09-11 | Stop reason: HOSPADM

## 2020-09-06 RX ADMIN — THIAMINE HYDROCHLORIDE 400 MG: 100 INJECTION, SOLUTION INTRAMUSCULAR; INTRAVENOUS at 20:47

## 2020-09-06 RX ADMIN — DESMOPRESSIN ACETATE 80 MG: 0.2 TABLET ORAL at 20:43

## 2020-09-06 RX ADMIN — THIAMINE HYDROCHLORIDE 400 MG: 100 INJECTION, SOLUTION INTRAMUSCULAR; INTRAVENOUS at 13:05

## 2020-09-06 RX ADMIN — APIXABAN 5 MG: 5 TABLET, FILM COATED ORAL at 20:43

## 2020-09-06 RX ADMIN — NICOTINE 1 PATCH: 14 PATCH TRANSDERMAL at 08:14

## 2020-09-06 RX ADMIN — SODIUM CHLORIDE, PRESERVATIVE FREE 2 ML: 5 INJECTION INTRAVENOUS at 08:19

## 2020-09-06 RX ADMIN — HALOPERIDOL LACTATE 2 MG: 5 INJECTION INTRAMUSCULAR at 00:31

## 2020-09-06 RX ADMIN — OLANZAPINE 5 MG: 10 TABLET, FILM COATED ORAL at 20:43

## 2020-09-06 RX ADMIN — ASPIRIN 81 MG: 81 TABLET, CHEWABLE ORAL at 08:09

## 2020-09-06 RX ADMIN — THIAMINE HYDROCHLORIDE 400 MG: 100 INJECTION, SOLUTION INTRAMUSCULAR; INTRAVENOUS at 08:57

## 2020-09-06 RX ADMIN — SERTRALINE HYDROCHLORIDE 100 MG: 50 TABLET, FILM COATED ORAL at 08:09

## 2020-09-06 RX ADMIN — SODIUM CHLORIDE, PRESERVATIVE FREE 2 ML: 5 INJECTION INTRAVENOUS at 20:45

## 2020-09-06 RX ADMIN — SACUBITRIL AND VALSARTAN 0.5 TABLET: 24; 26 TABLET, FILM COATED ORAL at 20:43

## 2020-09-06 RX ADMIN — ALUMINUM HYDROXIDE, MAGNESIUM HYDROXIDE, SIMETHICONE 20 ML: 400; 400; 40 SUSPENSION ORAL at 23:56

## 2020-09-06 RX ADMIN — APIXABAN 5 MG: 5 TABLET, FILM COATED ORAL at 08:09

## 2020-09-06 ASSESSMENT — PAIN SCALES - GENERAL
PAINLEVEL_OUTOF10: 0

## 2020-09-06 ASSESSMENT — PATIENT HEALTH QUESTIONNAIRE - PHQ9: IS PATIENT ABLE TO COMPLETE PHQ2 OR PHQ9: NO, PATIENT WILL NEVER BE ABLE TO COMPLETE

## 2020-09-07 LAB
BACTERIA BLD CULT: ABNORMAL
GRAM STN SPEC: ABNORMAL
REPORT STATUS (RPT): ABNORMAL
SPECIMEN SOURCE: ABNORMAL
VIT B12 SERPL-MCNC: 782 PG/ML (ref 211–911)

## 2020-09-07 PROCEDURE — 10002803 HB RX 637: Performed by: INTERNAL MEDICINE

## 2020-09-07 PROCEDURE — 10002807 HB RX 258: Performed by: INTERNAL MEDICINE

## 2020-09-07 PROCEDURE — 94640 AIRWAY INHALATION TREATMENT: CPT

## 2020-09-07 PROCEDURE — 10003585 HB ROOM CHARGE INTERMEDIATE CARE

## 2020-09-07 PROCEDURE — 10002801 HB RX 250 W/O HCPCS: Performed by: HOSPITALIST

## 2020-09-07 PROCEDURE — 10002803 HB RX 637: Performed by: HOSPITALIST

## 2020-09-07 PROCEDURE — 10004651 HB RX, NO CHARGE ITEM: Performed by: HOSPITALIST

## 2020-09-07 PROCEDURE — 10002803 HB RX 637: Performed by: PSYCHIATRY & NEUROLOGY

## 2020-09-07 PROCEDURE — 10002807 HB RX 258: Performed by: HOSPITALIST

## 2020-09-07 PROCEDURE — 10002800 HB RX 250 W HCPCS: Performed by: INTERNAL MEDICINE

## 2020-09-07 RX ORDER — ONDANSETRON 4 MG/1
4 TABLET, ORALLY DISINTEGRATING ORAL EVERY 6 HOURS PRN
Status: DISCONTINUED | OUTPATIENT
Start: 2020-09-07 | End: 2020-09-11 | Stop reason: HOSPADM

## 2020-09-07 RX ORDER — TRAZODONE HYDROCHLORIDE 50 MG/1
50 TABLET ORAL ONCE
Status: COMPLETED | OUTPATIENT
Start: 2020-09-07 | End: 2020-09-07

## 2020-09-07 RX ORDER — LANOLIN ALCOHOL/MO/W.PET/CERES
100 CREAM (GRAM) TOPICAL DAILY
Status: DISCONTINUED | OUTPATIENT
Start: 2020-09-09 | End: 2020-09-11 | Stop reason: HOSPADM

## 2020-09-07 RX ADMIN — DESMOPRESSIN ACETATE 80 MG: 0.2 TABLET ORAL at 20:28

## 2020-09-07 RX ADMIN — OLANZAPINE 5 MG: 10 TABLET, FILM COATED ORAL at 20:28

## 2020-09-07 RX ADMIN — APIXABAN 5 MG: 5 TABLET, FILM COATED ORAL at 20:28

## 2020-09-07 RX ADMIN — TRAZODONE HYDROCHLORIDE 50 MG: 50 TABLET ORAL at 20:28

## 2020-09-07 RX ADMIN — SODIUM CHLORIDE, PRESERVATIVE FREE 2 ML: 5 INJECTION INTRAVENOUS at 11:06

## 2020-09-07 RX ADMIN — SACUBITRIL AND VALSARTAN 0.5 TABLET: 24; 26 TABLET, FILM COATED ORAL at 11:44

## 2020-09-07 RX ADMIN — ONDANSETRON 4 MG: 4 TABLET, ORALLY DISINTEGRATING ORAL at 20:28

## 2020-09-07 RX ADMIN — SODIUM CHLORIDE 25 ML: 0.9 INJECTION, SOLUTION INTRAVENOUS at 14:29

## 2020-09-07 RX ADMIN — THIAMINE HYDROCHLORIDE 400 MG: 100 INJECTION, SOLUTION INTRAMUSCULAR; INTRAVENOUS at 14:30

## 2020-09-07 RX ADMIN — FAMOTIDINE 20 MG: 20 TABLET ORAL at 20:28

## 2020-09-07 RX ADMIN — CALCIUM CARBONATE (ANTACID) CHEW TAB 500 MG 500 MG: 500 CHEW TAB at 05:53

## 2020-09-07 RX ADMIN — APIXABAN 5 MG: 5 TABLET, FILM COATED ORAL at 11:05

## 2020-09-07 RX ADMIN — FAMOTIDINE 20 MG: 20 TABLET ORAL at 11:05

## 2020-09-07 RX ADMIN — THIAMINE HYDROCHLORIDE 400 MG: 100 INJECTION, SOLUTION INTRAMUSCULAR; INTRAVENOUS at 20:36

## 2020-09-07 RX ADMIN — ASPIRIN 81 MG: 81 TABLET, CHEWABLE ORAL at 11:05

## 2020-09-07 RX ADMIN — THIAMINE HYDROCHLORIDE 400 MG: 100 INJECTION, SOLUTION INTRAMUSCULAR; INTRAVENOUS at 11:15

## 2020-09-07 RX ADMIN — SODIUM CHLORIDE, PRESERVATIVE FREE 2 ML: 5 INJECTION INTRAVENOUS at 20:37

## 2020-09-07 RX ADMIN — FAMOTIDINE 20 MG: 20 TABLET ORAL at 00:20

## 2020-09-07 RX ADMIN — ALBUTEROL SULFATE 2.5 MG: 2.5 SOLUTION RESPIRATORY (INHALATION) at 16:20

## 2020-09-07 RX ADMIN — SACUBITRIL AND VALSARTAN 0.5 TABLET: 24; 26 TABLET, FILM COATED ORAL at 20:31

## 2020-09-07 RX ADMIN — ALBUTEROL SULFATE 2.5 MG: 2.5 SOLUTION RESPIRATORY (INHALATION) at 00:32

## 2020-09-07 RX ADMIN — SERTRALINE HYDROCHLORIDE 50 MG: 50 TABLET, FILM COATED ORAL at 11:05

## 2020-09-07 RX ADMIN — SODIUM CHLORIDE 25 ML: 0.9 INJECTION, SOLUTION INTRAVENOUS at 11:14

## 2020-09-07 ASSESSMENT — PAIN SCALES - GENERAL
PAINLEVEL_OUTOF10: 0
PAINLEVEL_OUTOF10: 0

## 2020-09-08 LAB
ANION GAP SERPL CALC-SCNC: 6 MMOL/L (ref 10–20)
BASOPHILS # BLD: 0 K/MCL (ref 0–0.3)
BASOPHILS NFR BLD: 1 %
BUN SERPL-MCNC: 18 MG/DL (ref 6–20)
BUN/CREAT SERPL: 19 (ref 7–25)
CALCIUM SERPL-MCNC: 8.1 MG/DL (ref 8.4–10.2)
CHLORIDE SERPL-SCNC: 112 MMOL/L (ref 98–107)
CO2 SERPL-SCNC: 27 MMOL/L (ref 21–32)
CREAT SERPL-MCNC: 0.95 MG/DL (ref 0.67–1.17)
DIFFERENTIAL METHOD BLD: ABNORMAL
EOSINOPHIL # BLD: 0.1 K/MCL (ref 0.1–0.5)
EOSINOPHIL NFR BLD: 2 %
ERYTHROCYTE [DISTWIDTH] IN BLOOD: 19 % (ref 11–15)
GLUCOSE SERPL-MCNC: 110 MG/DL (ref 65–99)
HCT VFR BLD CALC: 45 % (ref 39–51)
HGB BLD-MCNC: 14.4 G/DL (ref 13–17)
IMM GRANULOCYTES # BLD AUTO: 0 K/MCL (ref 0–0.2)
IMM GRANULOCYTES NFR BLD: 0 %
LYMPHOCYTES # BLD: 0.6 K/MCL (ref 1–4)
LYMPHOCYTES NFR BLD: 12 %
MAGNESIUM SERPL-MCNC: 2.2 MG/DL (ref 1.7–2.4)
MCH RBC QN AUTO: 29.8 PG (ref 26–34)
MCHC RBC AUTO-ENTMCNC: 32 G/DL (ref 32–36.5)
MCV RBC AUTO: 93 FL (ref 78–100)
MONOCYTES # BLD: 0.6 K/MCL (ref 0.3–0.9)
MONOCYTES NFR BLD: 11 %
NEUTROPHILS # BLD: 4 K/MCL (ref 1.8–7.7)
NEUTROPHILS NFR BLD: 74 %
NRBC BLD MANUAL-RTO: 0 /100 WBC
PLATELET # BLD: 143 K/MCL (ref 140–450)
POTASSIUM SERPL-SCNC: 4.4 MMOL/L (ref 3.4–5.1)
RBC # BLD: 4.84 MIL/MCL (ref 4.5–5.9)
SODIUM SERPL-SCNC: 141 MMOL/L (ref 135–145)
WBC # BLD: 5.4 K/MCL (ref 4.2–11)

## 2020-09-08 PROCEDURE — 36415 COLL VENOUS BLD VENIPUNCTURE: CPT

## 2020-09-08 PROCEDURE — 85025 COMPLETE CBC W/AUTO DIFF WBC: CPT

## 2020-09-08 PROCEDURE — 10002803 HB RX 637: Performed by: INTERNAL MEDICINE

## 2020-09-08 PROCEDURE — 10002803 HB RX 637: Performed by: HOSPITALIST

## 2020-09-08 PROCEDURE — 10002803 HB RX 637: Performed by: PSYCHIATRY & NEUROLOGY

## 2020-09-08 PROCEDURE — 10002807 HB RX 258: Performed by: INTERNAL MEDICINE

## 2020-09-08 PROCEDURE — 10003585 HB ROOM CHARGE INTERMEDIATE CARE

## 2020-09-08 PROCEDURE — 97530 THERAPEUTIC ACTIVITIES: CPT

## 2020-09-08 PROCEDURE — 83735 ASSAY OF MAGNESIUM: CPT

## 2020-09-08 PROCEDURE — 97166 OT EVAL MOD COMPLEX 45 MIN: CPT

## 2020-09-08 PROCEDURE — 10002800 HB RX 250 W HCPCS: Performed by: INTERNAL MEDICINE

## 2020-09-08 PROCEDURE — 99233 SBSQ HOSP IP/OBS HIGH 50: CPT | Performed by: PSYCHIATRY & NEUROLOGY

## 2020-09-08 PROCEDURE — 80048 BASIC METABOLIC PNL TOTAL CA: CPT

## 2020-09-08 PROCEDURE — 10004651 HB RX, NO CHARGE ITEM: Performed by: HOSPITALIST

## 2020-09-08 RX ADMIN — FAMOTIDINE 20 MG: 20 TABLET ORAL at 21:14

## 2020-09-08 RX ADMIN — DESMOPRESSIN ACETATE 80 MG: 0.2 TABLET ORAL at 21:14

## 2020-09-08 RX ADMIN — SODIUM CHLORIDE, PRESERVATIVE FREE 2 ML: 5 INJECTION INTRAVENOUS at 08:40

## 2020-09-08 RX ADMIN — FAMOTIDINE 20 MG: 20 TABLET ORAL at 08:34

## 2020-09-08 RX ADMIN — APIXABAN 5 MG: 5 TABLET, FILM COATED ORAL at 08:34

## 2020-09-08 RX ADMIN — SERTRALINE HYDROCHLORIDE 50 MG: 50 TABLET, FILM COATED ORAL at 08:34

## 2020-09-08 RX ADMIN — ASPIRIN 81 MG: 81 TABLET, CHEWABLE ORAL at 08:34

## 2020-09-08 RX ADMIN — SODIUM CHLORIDE, PRESERVATIVE FREE 2 ML: 5 INJECTION INTRAVENOUS at 21:19

## 2020-09-08 RX ADMIN — APIXABAN 5 MG: 5 TABLET, FILM COATED ORAL at 21:15

## 2020-09-08 RX ADMIN — SACUBITRIL AND VALSARTAN 0.5 TABLET: 24; 26 TABLET, FILM COATED ORAL at 21:14

## 2020-09-08 RX ADMIN — THIAMINE HYDROCHLORIDE 400 MG: 100 INJECTION, SOLUTION INTRAMUSCULAR; INTRAVENOUS at 08:39

## 2020-09-08 RX ADMIN — SACUBITRIL AND VALSARTAN 0.5 TABLET: 24; 26 TABLET, FILM COATED ORAL at 08:33

## 2020-09-08 RX ADMIN — OLANZAPINE 5 MG: 10 TABLET, FILM COATED ORAL at 21:15

## 2020-09-08 ASSESSMENT — COGNITIVE AND FUNCTIONAL STATUS - GENERAL
HELP NEEDED FOR TOILETING: A LOT
HELP NEEDED FOR PERSONAL GROOMING: A LITTLE
REMEMBERING TO TAKE MEDICATION: A LOT
UNDERSTANDING 10 TO 15 MIN SPEECH: A LITTLE
BASIC_MOBILITY_RAW_SCORE: 17
DAILY_ACTIVITY_CONVERTED_SCORE: 34.69
BASIC_MOBILITY_CONVERTED_SCORE: 39.67
HELP NEEDED FOR BATHING: A LOT
HELP NEEDED DRESSING REGULAR LOWER BODY CLOTHING: A LOT
FOLLOWS FAMILIAR CONVERSATION: A LITTLE
APPLIED_COGNITIVE_RAW_SCORE: 14
TAKING CARE OF COMPLICATED TASKS: A LOT
REMEMBERING WHERE THINGS ARE: A LOT
APPLIED_COGNITIVE_CONVERTED_SCORE: 32.02
REMEMBERING 5 ERRANDS WITH NO LIST: A LOT
HELP NEEDED DRESSING REGULAR UPPER BODY CLOTHING: A LITTLE
DAILY_ACTIVITY_RAW_SCORE: 15

## 2020-09-08 ASSESSMENT — PAIN SCALES - GENERAL
PAINLEVEL_OUTOF10: 0
PAINLEVEL_OUTOF10: 4

## 2020-09-09 LAB
BACTERIA BLD CULT: NORMAL
REPORT STATUS (RPT): NORMAL
SPECIMEN SOURCE: NORMAL

## 2020-09-09 PROCEDURE — 97116 GAIT TRAINING THERAPY: CPT

## 2020-09-09 PROCEDURE — 97535 SELF CARE MNGMENT TRAINING: CPT

## 2020-09-09 PROCEDURE — 10002803 HB RX 637: Performed by: INTERNAL MEDICINE

## 2020-09-09 PROCEDURE — 10004651 HB RX, NO CHARGE ITEM: Performed by: HOSPITALIST

## 2020-09-09 PROCEDURE — 10003585 HB ROOM CHARGE INTERMEDIATE CARE

## 2020-09-09 PROCEDURE — 10002803 HB RX 637: Performed by: HOSPITALIST

## 2020-09-09 PROCEDURE — 97530 THERAPEUTIC ACTIVITIES: CPT

## 2020-09-09 PROCEDURE — 10002803 HB RX 637: Performed by: PSYCHIATRY & NEUROLOGY

## 2020-09-09 RX ORDER — FUROSEMIDE 40 MG/1
40 TABLET ORAL DAILY
Status: SHIPPED | COMMUNITY
Start: 2020-09-09

## 2020-09-09 RX ORDER — BUPROPION HYDROCHLORIDE 150 MG/1
150 TABLET, EXTENDED RELEASE ORAL DAILY
Status: ON HOLD | COMMUNITY
End: 2020-09-10 | Stop reason: HOSPADM

## 2020-09-09 RX ORDER — FUROSEMIDE 40 MG/1
40 TABLET ORAL DAILY
Status: DISCONTINUED | OUTPATIENT
Start: 2020-09-09 | End: 2020-09-11 | Stop reason: HOSPADM

## 2020-09-09 RX ADMIN — APIXABAN 5 MG: 5 TABLET, FILM COATED ORAL at 20:58

## 2020-09-09 RX ADMIN — FAMOTIDINE 20 MG: 20 TABLET ORAL at 08:06

## 2020-09-09 RX ADMIN — SODIUM CHLORIDE, PRESERVATIVE FREE 2 ML: 5 INJECTION INTRAVENOUS at 21:01

## 2020-09-09 RX ADMIN — OLANZAPINE 5 MG: 10 TABLET, FILM COATED ORAL at 20:58

## 2020-09-09 RX ADMIN — FAMOTIDINE 20 MG: 20 TABLET ORAL at 20:58

## 2020-09-09 RX ADMIN — FUROSEMIDE 40 MG: 40 TABLET ORAL at 11:51

## 2020-09-09 RX ADMIN — Medication 100 MG: at 08:06

## 2020-09-09 RX ADMIN — SODIUM CHLORIDE, PRESERVATIVE FREE 2 ML: 5 INJECTION INTRAVENOUS at 08:09

## 2020-09-09 RX ADMIN — SACUBITRIL AND VALSARTAN 0.5 TABLET: 24; 26 TABLET, FILM COATED ORAL at 08:06

## 2020-09-09 RX ADMIN — APIXABAN 5 MG: 5 TABLET, FILM COATED ORAL at 08:06

## 2020-09-09 RX ADMIN — SACUBITRIL AND VALSARTAN 0.5 TABLET: 24; 26 TABLET, FILM COATED ORAL at 20:58

## 2020-09-09 RX ADMIN — SERTRALINE HYDROCHLORIDE 50 MG: 50 TABLET, FILM COATED ORAL at 08:06

## 2020-09-09 RX ADMIN — ASPIRIN 81 MG: 81 TABLET, CHEWABLE ORAL at 08:06

## 2020-09-09 RX ADMIN — DESMOPRESSIN ACETATE 80 MG: 0.2 TABLET ORAL at 20:58

## 2020-09-09 ASSESSMENT — COGNITIVE AND FUNCTIONAL STATUS - GENERAL
REMEMBERING WHERE THINGS ARE: A LOT
DAILY_ACTIVITY_RAW_SCORE: 15
APPLIED_COGNITIVE_RAW_SCORE: 14
UNDERSTANDING 10 TO 15 MIN SPEECH: A LITTLE
BASIC_MOBILITY_RAW_SCORE: 17
HELP NEEDED FOR BATHING: A LOT
BASIC_MOBILITY_CONVERTED_SCORE: 39.67
FOLLOWS FAMILIAR CONVERSATION: A LITTLE
DAILY_ACTIVITY_CONVERTED_SCORE: 34.69
REMEMBERING TO TAKE MEDICATION: A LOT
HELP NEEDED FOR PERSONAL GROOMING: A LITTLE
REMEMBERING 5 ERRANDS WITH NO LIST: A LOT
HELP NEEDED FOR TOILETING: A LOT
TAKING CARE OF COMPLICATED TASKS: A LOT
HELP NEEDED DRESSING REGULAR UPPER BODY CLOTHING: A LITTLE
APPLIED_COGNITIVE_CONVERTED_SCORE: 32.02
HELP NEEDED DRESSING REGULAR LOWER BODY CLOTHING: A LOT

## 2020-09-09 ASSESSMENT — ACTIVITIES OF DAILY LIVING (ADL): HOME_MANAGEMENT_TIME_ENTRY: 30

## 2020-09-09 ASSESSMENT — PAIN SCALES - GENERAL: PAINLEVEL_OUTOF10: 0

## 2020-09-10 PROCEDURE — 10002803 HB RX 637: Performed by: INTERNAL MEDICINE

## 2020-09-10 PROCEDURE — 10003585 HB ROOM CHARGE INTERMEDIATE CARE

## 2020-09-10 PROCEDURE — 10004651 HB RX, NO CHARGE ITEM: Performed by: HOSPITALIST

## 2020-09-10 PROCEDURE — 10002803 HB RX 637: Performed by: HOSPITALIST

## 2020-09-10 PROCEDURE — 10002803 HB RX 637: Performed by: PSYCHIATRY & NEUROLOGY

## 2020-09-10 RX ORDER — LANOLIN ALCOHOL/MO/W.PET/CERES
100 CREAM (GRAM) TOPICAL DAILY
Status: SHIPPED | COMMUNITY
Start: 2020-09-11

## 2020-09-10 RX ORDER — FAMOTIDINE 20 MG/1
20 TABLET, FILM COATED ORAL EVERY 12 HOURS SCHEDULED
Status: SHIPPED | COMMUNITY
Start: 2020-09-10

## 2020-09-10 RX ORDER — OLANZAPINE 5 MG/1
5 TABLET ORAL NIGHTLY
Status: SHIPPED | COMMUNITY
Start: 2020-09-10

## 2020-09-10 RX ORDER — ACETAMINOPHEN 325 MG/1
650 TABLET ORAL EVERY 4 HOURS PRN
Status: DISCONTINUED | OUTPATIENT
Start: 2020-09-10 | End: 2020-09-11 | Stop reason: HOSPADM

## 2020-09-10 RX ADMIN — OLANZAPINE 5 MG: 10 TABLET, FILM COATED ORAL at 20:35

## 2020-09-10 RX ADMIN — APIXABAN 5 MG: 5 TABLET, FILM COATED ORAL at 20:34

## 2020-09-10 RX ADMIN — FAMOTIDINE 20 MG: 20 TABLET ORAL at 08:41

## 2020-09-10 RX ADMIN — APIXABAN 5 MG: 5 TABLET, FILM COATED ORAL at 08:41

## 2020-09-10 RX ADMIN — SERTRALINE HYDROCHLORIDE 50 MG: 50 TABLET, FILM COATED ORAL at 08:41

## 2020-09-10 RX ADMIN — SACUBITRIL AND VALSARTAN 0.5 TABLET: 24; 26 TABLET, FILM COATED ORAL at 20:35

## 2020-09-10 RX ADMIN — FAMOTIDINE 20 MG: 20 TABLET ORAL at 20:35

## 2020-09-10 RX ADMIN — SODIUM CHLORIDE, PRESERVATIVE FREE 2 ML: 5 INJECTION INTRAVENOUS at 08:43

## 2020-09-10 RX ADMIN — SODIUM CHLORIDE, PRESERVATIVE FREE 2 ML: 5 INJECTION INTRAVENOUS at 20:36

## 2020-09-10 RX ADMIN — DESMOPRESSIN ACETATE 80 MG: 0.2 TABLET ORAL at 20:34

## 2020-09-10 RX ADMIN — Medication 100 MG: at 08:41

## 2020-09-10 RX ADMIN — ASPIRIN 81 MG: 81 TABLET, CHEWABLE ORAL at 08:41

## 2020-09-10 RX ADMIN — SACUBITRIL AND VALSARTAN 0.5 TABLET: 24; 26 TABLET, FILM COATED ORAL at 08:41

## 2020-09-10 RX ADMIN — FUROSEMIDE 40 MG: 40 TABLET ORAL at 08:41

## 2020-09-10 RX ADMIN — ACETAMINOPHEN 650 MG: 325 TABLET, FILM COATED ORAL at 23:22

## 2020-09-10 ASSESSMENT — PAIN SCALES - GENERAL
PAINLEVEL_OUTOF10: 0
PAINLEVEL_OUTOF10: 0
PAINLEVEL_OUTOF10: 7

## 2020-09-11 VITALS
OXYGEN SATURATION: 97 % | WEIGHT: 182.98 LBS | DIASTOLIC BLOOD PRESSURE: 59 MMHG | HEART RATE: 76 BPM | TEMPERATURE: 97.9 F | SYSTOLIC BLOOD PRESSURE: 94 MMHG | HEIGHT: 72 IN | BODY MASS INDEX: 24.78 KG/M2 | RESPIRATION RATE: 17 BRPM

## 2020-09-11 LAB
BACTERIA BLD CULT: NORMAL
BACTERIA BLD CULT: NORMAL
REPORT STATUS (RPT): NORMAL
REPORT STATUS (RPT): NORMAL
SPECIMEN SOURCE: NORMAL
SPECIMEN SOURCE: NORMAL

## 2020-09-11 PROCEDURE — 10002803 HB RX 637: Performed by: PSYCHIATRY & NEUROLOGY

## 2020-09-11 PROCEDURE — 97535 SELF CARE MNGMENT TRAINING: CPT

## 2020-09-11 PROCEDURE — 10002803 HB RX 637: Performed by: INTERNAL MEDICINE

## 2020-09-11 PROCEDURE — 10002803 HB RX 637: Performed by: HOSPITALIST

## 2020-09-11 PROCEDURE — 10004651 HB RX, NO CHARGE ITEM: Performed by: HOSPITALIST

## 2020-09-11 PROCEDURE — 97530 THERAPEUTIC ACTIVITIES: CPT

## 2020-09-11 RX ADMIN — SODIUM CHLORIDE, PRESERVATIVE FREE 2 ML: 5 INJECTION INTRAVENOUS at 10:05

## 2020-09-11 RX ADMIN — ASPIRIN 81 MG: 81 TABLET, CHEWABLE ORAL at 10:04

## 2020-09-11 RX ADMIN — APIXABAN 5 MG: 5 TABLET, FILM COATED ORAL at 10:05

## 2020-09-11 RX ADMIN — SERTRALINE HYDROCHLORIDE 50 MG: 50 TABLET, FILM COATED ORAL at 10:04

## 2020-09-11 RX ADMIN — Medication 100 MG: at 10:04

## 2020-09-11 RX ADMIN — NICOTINE 1 PATCH: 14 PATCH TRANSDERMAL at 10:05

## 2020-09-11 RX ADMIN — FAMOTIDINE 20 MG: 20 TABLET ORAL at 10:04

## 2020-09-11 ASSESSMENT — COGNITIVE AND FUNCTIONAL STATUS - GENERAL
BASIC_MOBILITY_RAW_SCORE: 17
DAILY_ACTIVITY_CONVERTED_SCORE: 34.69
REMEMBERING WHERE THINGS ARE: A LOT
HELP NEEDED DRESSING REGULAR UPPER BODY CLOTHING: A LITTLE
APPLIED_COGNITIVE_CONVERTED_SCORE: 32.02
REMEMBERING 5 ERRANDS WITH NO LIST: A LOT
BASIC_MOBILITY_CONVERTED_SCORE: 39.67
HELP NEEDED FOR PERSONAL GROOMING: A LITTLE
REMEMBERING TO TAKE MEDICATION: A LOT
APPLIED_COGNITIVE_RAW_SCORE: 14
UNDERSTANDING 10 TO 15 MIN SPEECH: A LITTLE
HELP NEEDED FOR BATHING: A LOT
DAILY_ACTIVITY_RAW_SCORE: 15
HELP NEEDED DRESSING REGULAR LOWER BODY CLOTHING: A LOT
HELP NEEDED FOR TOILETING: A LOT
FOLLOWS FAMILIAR CONVERSATION: A LITTLE
TAKING CARE OF COMPLICATED TASKS: A LOT

## 2020-09-11 ASSESSMENT — PAIN SCALES - GENERAL: PAINLEVEL_OUTOF10: 0

## 2020-09-11 ASSESSMENT — ACTIVITIES OF DAILY LIVING (ADL): HOME_MANAGEMENT_TIME_ENTRY: 15

## 2020-09-15 PROBLEM — Z09 HOSPITAL DISCHARGE FOLLOW-UP: Status: ACTIVE | Noted: 2020-09-15

## 2020-09-25 ENCOUNTER — OFF PREMISE (OUTPATIENT)
Dept: CARDIOLOGY | Age: 65
End: 2020-09-25

## 2020-09-25 ENCOUNTER — CLINICAL ABSTRACT (OUTPATIENT)
Dept: CARDIOLOGY | Age: 65
End: 2020-09-25

## 2020-10-18 ENCOUNTER — APPOINTMENT (OUTPATIENT)
Dept: GENERAL RADIOLOGY | Facility: HOSPITAL | Age: 65
DRG: 286 | End: 2020-10-18
Attending: EMERGENCY MEDICINE
Payer: COMMERCIAL

## 2020-10-18 ENCOUNTER — HOSPITAL ENCOUNTER (INPATIENT)
Facility: HOSPITAL | Age: 65
LOS: 8 days | Discharge: HOME | DRG: 286 | End: 2020-10-30
Attending: EMERGENCY MEDICINE | Admitting: INTERNAL MEDICINE
Payer: COMMERCIAL

## 2020-10-18 DIAGNOSIS — R77.8 TROPONIN LEVEL ELEVATED: ICD-10-CM

## 2020-10-18 DIAGNOSIS — L03.115 BILATERAL LOWER LEG CELLULITIS: ICD-10-CM

## 2020-10-18 DIAGNOSIS — L03.116 BILATERAL LOWER LEG CELLULITIS: ICD-10-CM

## 2020-10-18 DIAGNOSIS — J18.9 COMMUNITY ACQUIRED PNEUMONIA OF RIGHT LOWER LOBE OF LUNG: ICD-10-CM

## 2020-10-18 DIAGNOSIS — E87.70 HYPERVOLEMIA, UNSPECIFIED HYPERVOLEMIA TYPE: Primary | ICD-10-CM

## 2020-10-18 PROCEDURE — 96375 TX/PRO/DX INJ NEW DRUG ADDON: CPT

## 2020-10-18 PROCEDURE — 85025 COMPLETE CBC W/AUTO DIFF WBC: CPT | Performed by: EMERGENCY MEDICINE

## 2020-10-18 PROCEDURE — 80053 COMPREHEN METABOLIC PANEL: CPT | Performed by: EMERGENCY MEDICINE

## 2020-10-18 PROCEDURE — 93010 ELECTROCARDIOGRAM REPORT: CPT

## 2020-10-18 PROCEDURE — 83880 ASSAY OF NATRIURETIC PEPTIDE: CPT | Performed by: EMERGENCY MEDICINE

## 2020-10-18 PROCEDURE — 99285 EMERGENCY DEPT VISIT HI MDM: CPT

## 2020-10-18 PROCEDURE — 96367 TX/PROPH/DG ADDL SEQ IV INF: CPT

## 2020-10-18 PROCEDURE — 96365 THER/PROPH/DIAG IV INF INIT: CPT

## 2020-10-18 PROCEDURE — 71045 X-RAY EXAM CHEST 1 VIEW: CPT | Performed by: EMERGENCY MEDICINE

## 2020-10-18 PROCEDURE — 36415 COLL VENOUS BLD VENIPUNCTURE: CPT

## 2020-10-18 PROCEDURE — 93005 ELECTROCARDIOGRAM TRACING: CPT

## 2020-10-18 PROCEDURE — 84484 ASSAY OF TROPONIN QUANT: CPT | Performed by: EMERGENCY MEDICINE

## 2020-10-19 ENCOUNTER — APPOINTMENT (OUTPATIENT)
Dept: CT IMAGING | Facility: HOSPITAL | Age: 65
DRG: 286 | End: 2020-10-19
Attending: INTERNAL MEDICINE
Payer: COMMERCIAL

## 2020-10-19 PROBLEM — R77.8 TROPONIN LEVEL ELEVATED: Status: ACTIVE | Noted: 2020-10-19

## 2020-10-19 PROBLEM — L03.116 BILATERAL LOWER LEG CELLULITIS: Status: ACTIVE | Noted: 2020-10-19

## 2020-10-19 PROBLEM — E87.70 HYPERVOLEMIA, UNSPECIFIED HYPERVOLEMIA TYPE: Status: ACTIVE | Noted: 2020-10-19

## 2020-10-19 PROBLEM — E87.70 HYPERVOLEMIA: Status: ACTIVE | Noted: 2020-10-19

## 2020-10-19 PROBLEM — L03.115 BILATERAL LOWER LEG CELLULITIS: Status: ACTIVE | Noted: 2020-10-19

## 2020-10-19 PROBLEM — J18.9 COMMUNITY ACQUIRED PNEUMONIA OF RIGHT LOWER LOBE OF LUNG: Status: ACTIVE | Noted: 2020-10-19

## 2020-10-19 PROCEDURE — 87205 SMEAR GRAM STAIN: CPT | Performed by: EMERGENCY MEDICINE

## 2020-10-19 PROCEDURE — 87077 CULTURE AEROBIC IDENTIFY: CPT | Performed by: EMERGENCY MEDICINE

## 2020-10-19 PROCEDURE — 85025 COMPLETE CBC W/AUTO DIFF WBC: CPT | Performed by: HOSPITALIST

## 2020-10-19 PROCEDURE — 87070 CULTURE OTHR SPECIMN AEROBIC: CPT | Performed by: EMERGENCY MEDICINE

## 2020-10-19 PROCEDURE — 83735 ASSAY OF MAGNESIUM: CPT | Performed by: HOSPITALIST

## 2020-10-19 PROCEDURE — 99213 OFFICE O/P EST LOW 20 MIN: CPT

## 2020-10-19 PROCEDURE — 97530 THERAPEUTIC ACTIVITIES: CPT

## 2020-10-19 PROCEDURE — 81001 URINALYSIS AUTO W/SCOPE: CPT | Performed by: EMERGENCY MEDICINE

## 2020-10-19 PROCEDURE — 87186 SC STD MICRODIL/AGAR DIL: CPT | Performed by: EMERGENCY MEDICINE

## 2020-10-19 PROCEDURE — 97162 PT EVAL MOD COMPLEX 30 MIN: CPT

## 2020-10-19 PROCEDURE — 82962 GLUCOSE BLOOD TEST: CPT

## 2020-10-19 PROCEDURE — 80048 BASIC METABOLIC PNL TOTAL CA: CPT | Performed by: HOSPITALIST

## 2020-10-19 PROCEDURE — 84484 ASSAY OF TROPONIN QUANT: CPT | Performed by: HOSPITALIST

## 2020-10-19 PROCEDURE — S0077 INJECTION, CLINDAMYCIN PHOSP: HCPCS | Performed by: HOSPITALIST

## 2020-10-19 PROCEDURE — 97116 GAIT TRAINING THERAPY: CPT

## 2020-10-19 PROCEDURE — 87040 BLOOD CULTURE FOR BACTERIA: CPT | Performed by: EMERGENCY MEDICINE

## 2020-10-19 PROCEDURE — 71260 CT THORAX DX C+: CPT | Performed by: INTERNAL MEDICINE

## 2020-10-19 PROCEDURE — 84145 PROCALCITONIN (PCT): CPT | Performed by: INTERNAL MEDICINE

## 2020-10-19 PROCEDURE — S0077 INJECTION, CLINDAMYCIN PHOSP: HCPCS | Performed by: EMERGENCY MEDICINE

## 2020-10-19 PROCEDURE — 84132 ASSAY OF SERUM POTASSIUM: CPT | Performed by: INTERNAL MEDICINE

## 2020-10-19 RX ORDER — ONDANSETRON 2 MG/ML
4 INJECTION INTRAMUSCULAR; INTRAVENOUS EVERY 6 HOURS PRN
Status: DISCONTINUED | OUTPATIENT
Start: 2020-10-19 | End: 2020-10-30

## 2020-10-19 RX ORDER — CLINDAMYCIN PHOSPHATE 600 MG/50ML
600 INJECTION INTRAVENOUS EVERY 8 HOURS
Status: DISCONTINUED | OUTPATIENT
Start: 2020-10-19 | End: 2020-10-19

## 2020-10-19 RX ORDER — TRAZODONE HYDROCHLORIDE 50 MG/1
50 TABLET ORAL NIGHTLY PRN
Status: DISCONTINUED | OUTPATIENT
Start: 2020-10-19 | End: 2020-10-30

## 2020-10-19 RX ORDER — ACETAMINOPHEN 500 MG
500 TABLET ORAL EVERY 6 HOURS PRN
COMMUNITY

## 2020-10-19 RX ORDER — ATORVASTATIN CALCIUM 80 MG/1
80 TABLET, FILM COATED ORAL NIGHTLY
Status: DISCONTINUED | OUTPATIENT
Start: 2020-10-19 | End: 2020-10-30

## 2020-10-19 RX ORDER — FUROSEMIDE 10 MG/ML
40 INJECTION INTRAMUSCULAR; INTRAVENOUS
Status: DISCONTINUED | OUTPATIENT
Start: 2020-10-19 | End: 2020-10-21

## 2020-10-19 RX ORDER — METOPROLOL SUCCINATE 100 MG/1
100 TABLET, EXTENDED RELEASE ORAL DAILY
Status: DISCONTINUED | OUTPATIENT
Start: 2020-10-19 | End: 2020-10-21

## 2020-10-19 RX ORDER — LEVOFLOXACIN 5 MG/ML
750 INJECTION, SOLUTION INTRAVENOUS EVERY 24 HOURS
Status: DISCONTINUED | OUTPATIENT
Start: 2020-10-20 | End: 2020-10-20

## 2020-10-19 RX ORDER — LEVOFLOXACIN 5 MG/ML
INJECTION, SOLUTION INTRAVENOUS
Status: COMPLETED
Start: 2020-10-19 | End: 2020-10-19

## 2020-10-19 RX ORDER — ACETAMINOPHEN 325 MG/1
650 TABLET ORAL EVERY 6 HOURS PRN
Status: DISCONTINUED | OUTPATIENT
Start: 2020-10-19 | End: 2020-10-30

## 2020-10-19 RX ORDER — FENOFIBRATE 134 MG/1
134 CAPSULE ORAL
Status: DISCONTINUED | OUTPATIENT
Start: 2020-10-19 | End: 2020-10-30

## 2020-10-19 RX ORDER — DIPHENHYDRAMINE HCL 50 MG
50 CAPSULE ORAL NIGHTLY PRN
Status: DISCONTINUED | OUTPATIENT
Start: 2020-10-19 | End: 2020-10-30

## 2020-10-19 RX ORDER — ASPIRIN 81 MG/1
81 TABLET, CHEWABLE ORAL DAILY
Status: DISCONTINUED | OUTPATIENT
Start: 2020-10-19 | End: 2020-10-30

## 2020-10-19 RX ORDER — FUROSEMIDE 10 MG/ML
40 INJECTION INTRAMUSCULAR; INTRAVENOUS ONCE
Status: COMPLETED | OUTPATIENT
Start: 2020-10-19 | End: 2020-10-19

## 2020-10-19 RX ORDER — POTASSIUM CHLORIDE 20 MEQ/1
40 TABLET, EXTENDED RELEASE ORAL EVERY 4 HOURS
Status: COMPLETED | OUTPATIENT
Start: 2020-10-19 | End: 2020-10-19

## 2020-10-19 RX ORDER — ASPIRIN 81 MG/1
243 TABLET, CHEWABLE ORAL ONCE
Status: COMPLETED | OUTPATIENT
Start: 2020-10-19 | End: 2020-10-19

## 2020-10-19 RX ORDER — CLINDAMYCIN PHOSPHATE 600 MG/50ML
600 INJECTION INTRAVENOUS ONCE
Status: DISCONTINUED | OUTPATIENT
Start: 2020-10-19 | End: 2020-10-19

## 2020-10-19 RX ORDER — SERTRALINE HYDROCHLORIDE 100 MG/1
100 TABLET, FILM COATED ORAL DAILY
Status: DISCONTINUED | OUTPATIENT
Start: 2020-10-19 | End: 2020-10-30

## 2020-10-19 RX ORDER — LEVOFLOXACIN 5 MG/ML
750 INJECTION, SOLUTION INTRAVENOUS ONCE
Status: COMPLETED | OUTPATIENT
Start: 2020-10-19 | End: 2020-10-19

## 2020-10-19 RX ORDER — BUPROPION HYDROCHLORIDE 150 MG/1
150 TABLET ORAL
Status: DISCONTINUED | OUTPATIENT
Start: 2020-10-19 | End: 2020-10-30

## 2020-10-19 RX ORDER — ALBUTEROL SULFATE 90 UG/1
2 AEROSOL, METERED RESPIRATORY (INHALATION) EVERY 4 HOURS PRN
Status: DISCONTINUED | OUTPATIENT
Start: 2020-10-19 | End: 2020-10-30

## 2020-10-19 RX ORDER — CLINDAMYCIN PHOSPHATE 600 MG/50ML
600 INJECTION INTRAVENOUS ONCE
Status: DISCONTINUED | OUTPATIENT
Start: 2020-10-19 | End: 2020-10-20

## 2020-10-19 NOTE — CONSULTS
[facility] Cardiology Consultation    Ying Martín Patient Status:  Observation    1955 MRN [id number]   [facility] 2NE-A Attending Alexis Pena MD   Hosp Day # 0 PCP Jazmyn Choi MD     Reason for Consultation:  CHF. 100 mg Oral Daily   • Sertraline HCl  100 mg Oral Daily   • clindamycin  600 mg Intravenous Q8H   • Potassium Chloride ER  40 mEq Oral Q4H       Continuous Infusions:      Social History:   reports that he has been smoking.  He has been smoking about 1.00 p Value Date    TROP 0.067 () 10/19/2020    TROP 0.074 ([facility]) 10/18/2020         Invalid input(s): PBNPML                 Telemetry: SR    Laboratories and Data:  Diagnostics:    EKG, 10/19/2020:  Atrial Fibrillation.  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

## 2020-10-19 NOTE — PLAN OF CARE
NURSING ADMISSION NOTE        Patient admitted via Cart  Oriented to room. Safety precautions initiated. Bed in low position.        10/19/20 0312 10/19/20 0314 10/19/20 0316   Vital Signs   BP 98/59 93/59 (!) 86/50   BP Location Left arm Left arm Left ar

## 2020-10-19 NOTE — PHYSICAL THERAPY NOTE
PHYSICAL THERAPY EVALUATION - INPATIENT     Room Number: 2606/2606-A  Evaluation Date: 10/19/2020  Type of Evaluation: Initial  Physician Order: PT Eval and Treat    Presenting Problem: BLE cellulitis, hypervolemia, CA-PNA and elevated troponin  Reas Owned Equipment: Rolling walker;Cane;Other (Comment)(does not use DME)  Patient Regularly Uses: Reading glasses    Prior Level of [place]: Pt is indep c all ADLs/IADLs and amb (owns DME, however does not use); pt works part time as a construction/proj Little   -   Sitting down on and standing up from a chair with arms (e.g., wheelchair, bedside commode, etc.): A Little   -   Moving from lying on back to sitting on the side of the bed?: A Little   How much help from another person does the patient shireen bed;With  staff;Needs met;Call light within reach;RN aware of session/findings; All patient questions and concerns addressed; Alarm set    ASSESSMENT   Patient is a 60year old male admitted on 10/18/2020 for BLE cellulitis, hypervolemia, CA-PNA and elevat is able to ambulate 150 feet with or without the least restrictive assist device at assistance level: modified independent     Goal #4 Pt will demo ability to negotiate 2 steps c mod indep by DC   Goal #5    Goal #6    Goal Comments: Goals established on 1

## 2020-10-19 NOTE — ED PROVIDER NOTES
Patient Seen in: [facility] Emergency Department      History   Patient presents with:  Swelling Edema    Stated Complaint: swelling to BLE, dusky feet, swollen genitals    HPI    81-year-old male history of chronic heart failure EF 10-15% on echo 5 Conjunctivae normal.      Pupils: Pupils are equal, round, and reactive to light. Neck:      Musculoskeletal: Normal range of motion and neck supple. Cardiovascular:      Rate and Rhythm: Normal rate and regular rhythm. Pulses: Normal pulses. URINALYSIS WITH CULTURE REFLEX - Abnormal; Notable for the following components:    Urine Color Debra (*)     Clarity Urine Hazy (*)     Protein Urine 30  (*)     Urobilinogen Urine 4.0 (*)     RBC URINE 3-5 (*)     Hyaline Casts Present (*)     Mucous U here with bilateral lower extremity edema and erythema, scrotal edema found to have decompensated heart failure with elevated BNP greater than 7000, compared to baseline around 4000 8/20.  Also found to have elev troponin, likely demand ischemia, patient ha

## 2020-10-19 NOTE — PLAN OF CARE
Pt received at 0730 resting in bed. A&Ox4, no c/o pain. Pt with drowsiness but easily arousable. 2+ pitting edema to bilateral lower extremities, as well as scrotal edema. IV Lasix ordered per cardiology.  BP running low in 02'R systolic, pt asymptomatic, p facilitate oxygenation and minimize respiratory effort  - Oxygen supplementation based on oxygen saturation or ABGs  - Provide Smoking Cessation handout, if applicable  - Encourage broncho-pulmonary hygiene including cough, deep breathe, Incentive Spiromet

## 2020-10-19 NOTE — CONSULTS
[facility]  Report of Inpatient Wound Care Consultation     Olga Lidia Vance Patient Status:  Observation    1955 MRN [id number]   [facility] 2NE-A Attending Felipe Hyde MD   Hosp Day # 0 PCP Yasmine Drew MD R heel fissure as well as R anterior LE wound. R foot warm, +dorsal pedal pulse noted, capillary refill at 4 seconds. R anterior LE wound measures at (0.5cm x 0.7cm x 0.1cm) scant drainage, periwound dry, no odor.       Cleansed and applied border foam t

## 2020-10-20 PROCEDURE — 80048 BASIC METABOLIC PNL TOTAL CA: CPT | Performed by: INTERNAL MEDICINE

## 2020-10-20 PROCEDURE — 99211 OFF/OP EST MAY X REQ PHY/QHP: CPT

## 2020-10-20 RX ORDER — AMMONIUM LACTATE 12 G/100G
LOTION TOPICAL AS NEEDED
Status: DISCONTINUED | OUTPATIENT
Start: 2020-10-20 | End: 2020-10-30

## 2020-10-20 NOTE — PROGRESS NOTES
[facility] Hospitalist Progress Note                                                                   Mäe 48  2/25/1955    SUBJECTIVE:  Swelling is improving.        OBJECTIVE:  Temp:  [ Troponin level elevated    Community acquired pneumonia of right lower lobe of lung    Bilateral lower leg cellulitis    # acute on chronic systolic CHF  # severe ischemic cardiomyopathy  - secondary to med non compliance  - IV lasix per cards  - has ICD i

## 2020-10-20 NOTE — DIETARY NOTE
[address] [place] [address]     Admitting diagnosis:  Troponin level elevated [R77.8]  Bilateral lower leg cellulitis [L03.116, L03.115]  Hypervolemia, unspecified hypervolemia type [E87.70]  Community acquired pneumonia of ri

## 2020-10-20 NOTE — CM/SW NOTE
10/20/20 1300   CM/SW Referral Data   Referral Source Social Work (self-referral)   Reason for Referral Discharge planning   Informant Patient   Patient Info   Patient's Mental Status Alert;Oriented   Patient's Home Environment [facility]   Patient [facility]

## 2020-10-20 NOTE — PROGRESS NOTES
[facility] [address] [place] [address] Cardiology Progress Note        Vedia Romberg Patient Status:  Observation    1955 MRN [id number]   [facility] 2NE-A Attending Eulalia Ricardo MD   Hosp Day # 0 PCP Jacquie Lock MD LE edema. No clubbing or cyanosis. Neurologic: Alert and oriented, normal affect. Skin: Warm and dry.            LABS:      HEM:  Recent Labs   Lab 10/18/20  2248 10/19/20  0709   WBC 5.6 4.9   HGB 15.0 13.9   HCT 46.6 43.2   .0* 109.0*       Ch Resume entresto at low dose. Follow bp's     5. Continue BB     6.  Will consider addition of spironolactone     7. miriam Sheffield Hopping  10/20/2020  8:57 AM

## 2020-10-20 NOTE — PLAN OF CARE
Pt alert x 3- vital signs stable- tele a-fib controlled rate- pt on eliquis  Anasarca, bilateral lower ext, scrotal edema- pt diurese with lasix iv BID  -plan of care explained to pt  -daily wt   -labs in am  -RLL PNA- levaquin iv  -pt stable  Problem: CAR INTERVENTIONS:  - Monitor Blood Glucose as ordered  - Assess for signs and symptoms of hyperglycemia and hypoglycemia  - Administer ordered medications to maintain glucose within target range  - Assess barriers to adequate nutritional intake and initiate n

## 2020-10-20 NOTE — PLAN OF CARE
Pt received at 0730 resting in bed. A&Ox4. No c/o pain. Swelling to lower extremities and scrotal edema. IV lasix given as ordered. Pt to work with PT/OT  today. A fib on the monitor, rates controlled. BP running in low in the 30'D systolic.  No s/s hypoten respiratory difficulty  - Respiratory Therapy support as indicated  - Manage/alleviate anxiety  - Monitor for signs/symptoms of CO2 retention  Outcome: Progressing     Problem: Impaired Functional Mobility  Goal: Achieve highest/safest level of mobility/ga No

## 2020-10-21 PROCEDURE — 97165 OT EVAL LOW COMPLEX 30 MIN: CPT

## 2020-10-21 PROCEDURE — 97530 THERAPEUTIC ACTIVITIES: CPT

## 2020-10-21 RX ORDER — FUROSEMIDE 10 MG/ML
60 INJECTION INTRAMUSCULAR; INTRAVENOUS
Status: DISCONTINUED | OUTPATIENT
Start: 2020-10-21 | End: 2020-10-23

## 2020-10-21 RX ORDER — METOPROLOL SUCCINATE 50 MG/1
50 TABLET, EXTENDED RELEASE ORAL
Status: DISCONTINUED | OUTPATIENT
Start: 2020-10-22 | End: 2020-10-21

## 2020-10-21 RX ORDER — METOPROLOL SUCCINATE 50 MG/1
50 TABLET, EXTENDED RELEASE ORAL
Status: DISCONTINUED | OUTPATIENT
Start: 2020-10-21 | End: 2020-10-22

## 2020-10-21 NOTE — CM/SW NOTE
Pt is accepted at [facility]. Reserved in [address]. Provided agency with pt's updated insurance information. Discharge summary updated with their contact information.       &  to remain available and supportive for discharge pl

## 2020-10-21 NOTE — PLAN OF CARE
Pt denies c/o pain, malaise, or cardiac symptoms. A&Ox4. Lungs diminished bilaterally with inspiratory wheezing and crackles at bilateral bases, on room air. Abdomen soft and non-tender with active bowel sounds in all four quadrants. BLE edema +2.  Stana Moose Oxygen supplementation based on oxygen saturation or ABGs  - Provide Smoking Cessation handout, if applicable  - Encourage broncho-pulmonary hygiene including cough, deep breathe, Incentive Spirometry  - Assess the need for suctioning and perform as needed response  - Implement non-pharmacological measures as appropriate and evaluate response  - Consider cultural and social influences on pain and pain management  - Manage/alleviate anxiety  - Utilize distraction and/or relaxation techniques  - Monitor for op their own health  - Refer to Case Management Department for coordinating discharge planning if the patient needs post-hospital services based on physician/LIP order or complex needs related to functional status, cognitive ability or social support system

## 2020-10-21 NOTE — OCCUPATIONAL THERAPY NOTE
OCCUPATIONAL THERAPY EVALUATION - INPATIENT     Room Number: 2606/2606-A  Evaluation Date: 10/21/2020  Type of Evaluation: Initial  Presenting Problem: hypervolemia    Physician Order: IP Consult to Occupational Therapy  Reason for Therapy: ADL/IADL Dysfun glasses    Prior Level of Function: Pt is indep c all ADLs/IADLs and amb (owns DME, however does not use); pt works part time as a construction/; noticed swelling ~3 wks ago and got progressively worse; has son locally who can help intermitt urinal? : A Little  -   Putting on and taking off regular upper body clothing?: None  -   Taking care of personal grooming such as brushing teeth?: None  -   Eating meals?: None    AM-PAC Score:  Score: 21  Approx Degree of Impairment: 32.79%  Standardized the following performance deficits: pain, balance. These deficits impact the patient’s ability to participate in BADLs and functional transfers, instrumental activities of daily living, rest and sleep, work, leisure and social participation.      The steve routine.

## 2020-10-21 NOTE — PROGRESS NOTES
[facility] Hospitalist Progress Note                                                                   Mäe 48  2/25/1955    SUBJECTIVE:  LE edema improving slowly.   Scrotum overall impro Hypervolemia, unspecified hypervolemia type  Active Problems:    Hypervolemia    Troponin level elevated    Community acquired pneumonia of right lower lobe of lung    Bilateral lower leg cellulitis    # acute on chronic systolic CHF  # severe ischemic car

## 2020-10-21 NOTE — PLAN OF CARE
Problem: Impaired Activities of Daily Living  Goal: Achieve highest/safest level of independence in self care  Description: Interventions:  - Assess ability and encourage patient to participate in ADLs to maximize function  - Promote sitting position [facility]

## 2020-10-21 NOTE — PROGRESS NOTES
[facility] Cardiology Progress Note        Kieran Alford Patient Status:  Observation    1955 MRN [id number]   [facility] 2NE-A Attending Lizbeth Damon MD   Hosp Day # 0 PCP Virl Dubin, MD Neurologic: Alert and oriented, normal affect. Skin: Warm and dry.            LABS:      HEM:  Recent Labs   Lab 10/18/20  2248 10/19/20  0709   WBC 5.6 4.9   HGB 15.0 13.9   HCT 46.6 43.2   .0* 109.0*       Chem:  Recent Labs   Lab 10/18/20  2248 low dose. Follow bp's. Would accept SBP's as low as 85     5. Continue BB     6.  Will consider addition of spironolactone     7. miriam Sheffield

## 2020-10-21 NOTE — PHYSICAL THERAPY NOTE
PHYSICAL THERAPY TREATMENT NOTE - INPATIENT    Room Number: 2606/2606-A     Session: 1   Number of Visits to Meet Established Goals: 3     History related to current admission: Pt was admitted from home on 10/18/2020 with BLE cellulitis, hypervolemia, CA- BEARING RESTRICTION  Weight Bearing Restriction: None                PAIN ASSESSMENT   Rating: Unable to rate  Location: scrotal pain  Management Techniques: Repositioning    BALANCE current level of pain and swelling, RN contacted and aware of patient concerns. Patient End of Session: Needs met;Call light within reach;RN aware of session/findings; All patient questions and concerns addressed(sitting at EOB)    ASSESSMENT   Patikera

## 2020-10-22 ENCOUNTER — APPOINTMENT (OUTPATIENT)
Dept: ULTRASOUND IMAGING | Facility: HOSPITAL | Age: 65
DRG: 286 | End: 2020-10-22
Attending: HOSPITALIST
Payer: COMMERCIAL

## 2020-10-22 PROCEDURE — 76870 US EXAM SCROTUM: CPT | Performed by: HOSPITALIST

## 2020-10-22 PROCEDURE — 93925 LOWER EXTREMITY STUDY: CPT | Performed by: HOSPITALIST

## 2020-10-22 PROCEDURE — 93975 VASCULAR STUDY: CPT | Performed by: HOSPITALIST

## 2020-10-22 PROCEDURE — 85025 COMPLETE CBC W/AUTO DIFF WBC: CPT | Performed by: INTERNAL MEDICINE

## 2020-10-22 RX ORDER — DOBUTAMINE HYDROCHLORIDE 200 MG/100ML
INJECTION INTRAVENOUS CONTINUOUS
Status: DISCONTINUED | OUTPATIENT
Start: 2020-10-22 | End: 2020-10-29

## 2020-10-22 NOTE — PROGRESS NOTES
Assumed care of patient at 1130. Alert and oriented. Atrial fibrillation on telemetry. Edema to BLE up to abdomen-pitting. Scrotum extremely edematous. Discussed the importance of elevating extremities and scrotum.   Patient would stay elevated for shor

## 2020-10-22 NOTE — PHYSICAL THERAPY NOTE
Attempted to see Pt this PM - RN aware of attempt. Pt off floor at multiple US - not available today. Will continue to follow.

## 2020-10-22 NOTE — PROGRESS NOTES
[facility] Hospitalist Progress Note                                                                   Mäe 48  2/25/1955  Cc: follow up    SUBJECTIVE:  Called by NADJA Pino that pt with staci Continuous Infusions:   PRN: ammonium lactate, diphenhydrAMINE HCl, Albuterol Sulfate HFA, acetaminophen, traZODone HCl, ondansetron HCl    Assessment/Plan:  Principal Problem:    Hypervolemia, unspecified hypervolemia type  Active Problems:    Hypervo

## 2020-10-22 NOTE — PROGRESS NOTES
[facility] Cardiology Progress Note        Desi [place] Patient Status:  Observation    1955 MRN [id number]   [facility] 2NE-A Attending Liang Helton MD   Hosp Day # 0 PCP Cathy Gan MD percussion. : marked scrotal edema. Abdomen: Soft, non-tender. Extremities: 3+ bilateral LE edema. Erythematous, cool  Neurologic: somnolent and oriented, normal affect. Skin: Warm and dry.            LABS:      HEM:  Recent Labs   Lab 10/18/20  22 Hypertension  . Now hypotensive 2/2 Cardiomyopathy, meds.        Recommend:    Marked decompensation at present. Start  dobutamine. Other cardiac (entresto, BB) meds on hold.   Tolerate low BP's unless symptomatic.                              Lizeth

## 2020-10-22 NOTE — PLAN OF CARE
Pt denies c/o pain, malaise, or cardiac symptoms. A&Ox4. Lungs diminished bilaterally with inspiratory wheezing and crackles at bases, on room air. Pt afib on monitor with occasional PVCs.  Abdomen soft and non-tender with active bowel sounds in all four qu applicable  - Encourage broncho-pulmonary hygiene including cough, deep breathe, Incentive Spirometry  - Assess the need for suctioning and perform as needed  - Assess and instruct to report SOB or any respiratory difficulty  - Respiratory Therapy support activity and pre-medicate as appropriate  Outcome: Progressing     Problem: RISK FOR INFECTION - ADULT  Goal: Absence of fever/infection during anticipated neutropenic period  Description: INTERVENTIONS  - Monitor WBC  - Administer growth factors as ordere care  Description: Interventions:  - Assess ability and encourage patient to participate in ADLs to maximize function  - Promote sitting position while performing ADLs such as feeding, grooming, and bathing  - Educate and encourage patient/family in [place]

## 2020-10-22 NOTE — OCCUPATIONAL THERAPY NOTE
Patient's chart reviewed and noted patient is awaiting US doppler of BLE and scrotum d/t cool to touch, per RN assessment. Will hold for now and f/u pending testing results.

## 2020-10-23 ENCOUNTER — APPOINTMENT (OUTPATIENT)
Dept: GENERAL RADIOLOGY | Facility: HOSPITAL | Age: 65
DRG: 286 | End: 2020-10-23
Attending: INTERNAL MEDICINE
Payer: COMMERCIAL

## 2020-10-23 PROCEDURE — 71045 X-RAY EXAM CHEST 1 VIEW: CPT | Performed by: INTERNAL MEDICINE

## 2020-10-23 PROCEDURE — 80053 COMPREHEN METABOLIC PANEL: CPT | Performed by: HOSPITALIST

## 2020-10-23 PROCEDURE — 83735 ASSAY OF MAGNESIUM: CPT | Performed by: HOSPITALIST

## 2020-10-23 PROCEDURE — 02HV33Z INSERTION OF INFUSION DEVICE INTO SUPERIOR VENA CAVA, PERCUTANEOUS APPROACH: ICD-10-PCS | Performed by: INTERNAL MEDICINE

## 2020-10-23 PROCEDURE — 85027 COMPLETE CBC AUTOMATED: CPT | Performed by: HOSPITALIST

## 2020-10-23 PROCEDURE — 36573 INSJ PICC RS&I 5 YR+: CPT

## 2020-10-23 PROCEDURE — B548ZZA ULTRASONOGRAPHY OF SUPERIOR VENA CAVA, GUIDANCE: ICD-10-PCS | Performed by: INTERNAL MEDICINE

## 2020-10-23 RX ORDER — POTASSIUM CHLORIDE 20 MEQ/1
40 TABLET, EXTENDED RELEASE ORAL ONCE
Status: COMPLETED | OUTPATIENT
Start: 2020-10-23 | End: 2020-10-23

## 2020-10-23 RX ORDER — LIDOCAINE HYDROCHLORIDE 10 MG/ML
5 INJECTION, SOLUTION EPIDURAL; INFILTRATION; INTRACAUDAL; PERINEURAL ONCE AS NEEDED
Status: ACTIVE | OUTPATIENT
Start: 2020-10-23 | End: 2020-10-23

## 2020-10-23 RX ORDER — DOPAMINE HYDROCHLORIDE 320 MG/100ML
3 INJECTION, SOLUTION INTRAVENOUS CONTINUOUS
Status: DISCONTINUED | OUTPATIENT
Start: 2020-10-23 | End: 2020-10-30

## 2020-10-23 RX ORDER — FUROSEMIDE 10 MG/ML
60 INJECTION INTRAMUSCULAR; INTRAVENOUS ONCE
Status: COMPLETED | OUTPATIENT
Start: 2020-10-23 | End: 2020-10-23

## 2020-10-23 NOTE — PAYOR COMM NOTE
--------------  ADMISSION REVIEW     Payor: [facility]  Subscriber #:  [id number]  Authorization Number: [id number]    Admit date: 10/22/20  Admit time: 1134       Admitting Physician: Ferdinand Chirinos MD  Attending Physician:  Michael Borges, Temp src Temporal   SpO2 100 %   O2 Device None (Room air)       Current:/86   Pulse 71   Temp 97.8 °F (36.6 °C) (Temporal)   Resp 21   Ht 182.9 cm (6')   Wt 90.7 kg   SpO2 99%   BMI 27.12 kg/m²         Physical Exam  Vitals signs and nursing note re Mood and Affect: Mood normal.             ED Course     Labs Reviewed   COMP METABOLIC PANEL (14) - Abnormal; Notable for the following components:       Result Value    Alkaline Phosphatase 245 (*)     Bilirubin, Total 3.0 (*)     Albumin 2.7 (*)     G AEROBIC BACTERIAL CULTURE     EKG    Rate, intervals and axes as noted on EKG Report.   Rate: 76  Rhythm: Sinus Rhythm  Reading: No new acute ischemic changes, old T wave inversions seen in inferior leads, V5–V6, compared with old EKG 3/29/2019 Bilateral lower leg cellulitis    Disposition:  Admit  10/19/2020 12:42 am    Follow-up:  No follow-up provider specified.         Medications Prescribed:  Current Discharge Medication List                       Present on Admission  Date Reviewed: 9/28/202 Smoking status: Current Every Day Smoker        Packs/day: 1.00      Smokeless tobacco: Never Used    Alcohol use:  Yes      Alcohol/week: 0.0 standard drinks      Frequency: 2-3 times a week      Comment: 2-6 BEERS, 3-4 DAYS OF THE WEEK       Anthony JAMISON CO2 30.0 10/19/2020    GLU 78 10/19/2020    CA 8.6 10/19/2020    ALB 2.7 10/18/2020    ALKPHO 245 10/18/2020    BILT 3.0 10/18/2020    TP 7.8 10/18/2020    AST 29 10/18/2020    ALT 19 10/18/2020    MG 2.3 10/19/2020    TROP 0.067 10/19/2020       CXR: [facility]sa Outpatient records or previous hospital records reviewed. [facility] hospitalist to continue to follow patient while in house  A total of 75  minutes taken with patient and coordinating care. Greater than 50% face to face encounter.       Otis Crane MD  [facility] Past Surgical History:   Procedure Laterality Date   • ANGIOGRAM       • ANGIOPLASTY (CORONARY)       • OTHER SURGICAL HISTORY         R foot fracture            Family History   Problem Relation Age of Onset   • Diabetes Father           type 2   • Diabet Neck: No JVD, carotids 2+ no bruits. Cardiac: Regular S1S2. No S3, S4, rub, click. No murmur. Lungs: course BS bilaterally     Abdomen: Soft, non-tender. : scrotal edema , moderate  Extremities: 2+ bilateral LE edema.   No clubbing or cyanosis  Neur 2. IV diuresis: lasix 40 mg IV bid.       3. Follow BMP, I/O's, weights     4. Resume entresto as bp allows     5. Continue BB     6. Will consider addition of spironolactone     7. Eliquis, asa     8.  RLL PNA treatment per IM              Anita Cardenas Ge • atorvastatin  80 mg Oral Nightly   • buPROPion HCl ER (XL)  150 mg Oral Daily   • fenofibrate micronized  134 mg Oral QAM AC   • Metoprolol Succinate ER  100 mg Oral Daily   • Sertraline HCl  100 mg Oral Daily   • furosemide  40 mg Intravenous BID (Diure Gen: No acute distress, alert and oriented x3, no focal neurologic deficits  Pulm: Lungs clear bilaterally, normal respiratory effort  CV: Heart with regular rate and rhythm, no murmur. Normal PMI.     Abd: Abdomen soft, nontender, nondistended, no organom - elevation NOT consistent w ACS     # abnormal cxr  - dense consolidation noted but clinically not behaving like pna (no cough, fever, leukocytosis)  - CT consistent w fluid/procal neg- no evidence of pna. Dced abx.   Cont diuresis     # tobacco dependenc 1914 10/20/20  0630    136  --  134*   K 3.5 3.3* 4.1 4.5    102  --  105   CO2 31.0 30.0  --  23.0   BUN 18 19*  --  20*   CREATSERUM 1.19 1.15  --  1.20   CA 8.8 8.6  --  8.8   MG  --  2.3  --   --    GLU 98 78  --  76             Recent Labs - CT consistent w fluid/procal neg- no evidence of pna. Dced abx.   Cont diuresis     # tobacco dependence  -encourage cessation     # p afib- no acute issue  - on eliquis     # chronic hypotension- no acute issue  - 2/2 cardiomyopathy  - follow     # thro furosemide (LASIX) 250 mg in sodium chloride 0.9% 125 mL infusion     Date Action Dose Route User    10/23/2020 1033 New Bag 10 mg/hr Intravenous Mark Ling, RN      Potassium Chloride ER (K-DUR M20) CR tab 40 mEq     Date Action Dose Route User    10/

## 2020-10-23 NOTE — PLAN OF CARE
Assumed care of patient around 0730. Patient laying in bed, drowsy, ox4. Able to follow commands. Denies any chest pain or sob at rest. Room air. afib on tele with occasional pvcs. +2 pitting BLE. Functional urinary incontinence due to scrotal edema.  DW,

## 2020-10-23 NOTE — PROGRESS NOTES
Assumed care of patient at 0730. Alert, but very lethargic. SBP in the 80s. Lower extremities purple, cold, tight. Pulses palpable. Scrotum ice cold and extremely edematous and painful. MD notified. Orders rec'd. Vascular consulted.   BLE arterial do

## 2020-10-23 NOTE — PLAN OF CARE
Pt denies c/o pain, malaise, or cardiac symptoms. A&Ox4. Lungs diminished bilaterally with crackles and inspiratory wheezing, equal expansion, on room air. Pt afib on monitor with occasional PVCs.  Abdomen soft and non-tender with active bowel sounds in all broncho-pulmonary hygiene including cough, deep breathe, Incentive Spirometry  - Assess the need for suctioning and perform as needed  - Assess and instruct to report SOB or any respiratory difficulty  - Respiratory Therapy support as indicated  - Manage/a appropriate  Outcome: Progressing     Problem: RISK FOR INFECTION - ADULT  Goal: Absence of fever/infection during anticipated neutropenic period  Description: INTERVENTIONS  - Monitor WBC  - Administer growth factors as ordered  - Implement neutropenic gu Interventions:  - Assess ability and encourage patient to participate in ADLs to maximize function  - Promote sitting position while performing ADLs such as feeding, grooming, and bathing  - Educate and encourage patient/family in tolerated functional acti

## 2020-10-23 NOTE — PROGRESS NOTES
Addendum: called about wound cultures done upon admission on 10/19 and results. No overt cellulitis noted on exam today-d/w RN on 2CTU- no current weeping or drainage. Suspect discoloration of skin from stasis. Will add crp/esr for now.    Pct upon admissi Oral Nightly   • buPROPion HCl ER (XL)  150 mg Oral Daily   • fenofibrate micronized  134 mg Oral QAM AC   • Sertraline HCl  100 mg Oral Daily     Continuous Infusions:   • DOPamine in D5W 2 mcg/kg/min (10/23/20 1029)   • furosemide (LASIX) infusion 10 mg/

## 2020-10-23 NOTE — OCCUPATIONAL THERAPY NOTE
Noted patient's US results and plan on transferring to ICU. Will hold for now. Please re-order when medically appropriate.

## 2020-10-23 NOTE — PROGRESS NOTES
[facility] Cardiology Progress Note        Antoniodakotah Haysan Patient Status:  Observation    1955 MRN [id number]   [facility] 2NE-A Attending Shana Lesches, MD   Hosp Day # 1 PCP Khushboo Kerr MD affect. Skin: Warm and dry.            LABS:      HEM:  Recent Labs   Lab 10/18/20  2248 10/19/20  0709 10/22/20  0543 10/23/20  0635   WBC 5.6 4.9 5.7 4.6   HGB 15.0 13.9 13.8 13.0   HCT 46.6 43.2 43.4 40.9   .0* 109.0* 113.0* 95.0*       Chem:  Re meds.        Recommend:       Marked decompensation at present. Continue dobutamine. Add low dose dopamine. IV lasix drip. Replace potassium. Other cardiac meds on hold.   Tolerate low BP's unless symptomatic.     Cc 30 min.     Valentino Citron

## 2020-10-24 PROCEDURE — 83735 ASSAY OF MAGNESIUM: CPT | Performed by: HOSPITALIST

## 2020-10-24 PROCEDURE — 80048 BASIC METABOLIC PNL TOTAL CA: CPT | Performed by: INTERNAL MEDICINE

## 2020-10-24 PROCEDURE — 84132 ASSAY OF SERUM POTASSIUM: CPT | Performed by: HOSPITALIST

## 2020-10-24 PROCEDURE — 82962 GLUCOSE BLOOD TEST: CPT

## 2020-10-24 PROCEDURE — 80048 BASIC METABOLIC PNL TOTAL CA: CPT | Performed by: HOSPITALIST

## 2020-10-24 PROCEDURE — 86140 C-REACTIVE PROTEIN: CPT | Performed by: HOSPITALIST

## 2020-10-24 PROCEDURE — 85652 RBC SED RATE AUTOMATED: CPT | Performed by: HOSPITALIST

## 2020-10-24 PROCEDURE — 85027 COMPLETE CBC AUTOMATED: CPT | Performed by: HOSPITALIST

## 2020-10-24 RX ORDER — CYCLOBENZAPRINE HCL 5 MG
5 TABLET ORAL 3 TIMES DAILY PRN
Status: DISCONTINUED | OUTPATIENT
Start: 2020-10-24 | End: 2020-10-30

## 2020-10-24 RX ORDER — POTASSIUM CHLORIDE 20 MEQ/1
40 TABLET, EXTENDED RELEASE ORAL EVERY 4 HOURS
Status: COMPLETED | OUTPATIENT
Start: 2020-10-24 | End: 2020-10-24

## 2020-10-24 NOTE — PLAN OF CARE
Assumed patient care this am. Patient neurologically intact. Dobutamine, dopamine and lasix gtts infusing. Patient diuresing. Scrotum and lower extremity edema improving. Patient tolerating cardiac diet. Will continue to monitor closely.

## 2020-10-24 NOTE — PROGRESS NOTES
[facility] Hospitalist Progress Note                                                                   Mäe 48  2/25/1955  Cc: follow up    SUBJECTIVE: Pt in ICU, on dobutamine and d • fenofibrate micronized  134 mg Oral QAM AC   • Sertraline HCl  100 mg Oral Daily     Continuous Infusions:   • DOPamine in D5W 3 mcg/kg/min (10/24/20 0400)   • furosemide (LASIX) infusion 10 mg/hr (10/24/20 1400)   • DOBUTamine 7.5 mcg/kg/min (10/24/20 monitor     Prev:  Cont fritz     D/w RN      Thank Jennie Martinez MD    [facility] Hospitalist  Answering Service number: [phone]

## 2020-10-24 NOTE — PROGRESS NOTES
Eli [address] [place] [address] Cardiology Progress Note        Ty Push Patient Status:  Observation    1955 MRN [id number]   [facility] 2NE-A Attending Matias Pulido MD   Hosp Day # 2 PCP Milo Resendez MD Erythematous, cool  Neurologic: somnolent and oriented, normal affect. Skin: Warm and dry.            LABS:      HEM:  Recent Labs   Lab 10/18/20  2248 10/19/20  0709 10/22/20  0543 10/23/20  0635 10/24/20  0113   WBC 5.6 4.9 5.7 4.6 5.4   HGB 15.0 13.9 13 CABG     4. Abnormal CXR. CT suggest fluid in fissure rather than PNA/mass     5. Elevated troponin. Plateau pattern. No clinical evidence for ACS     6. Dyslipidemia      7. PVD     8. s/p ICD     9. Paroxysmal Atrial Fibrillation, on eliquis     10.  Hype

## 2020-10-24 NOTE — PLAN OF CARE
Assumed care at 1930 on Dopamine gtt 3 mcg/kg/min, Dobutamine 7.5 mcg/kg/min and Lasix 10 mg/hr. O2 2L/NC. Pt takes O2 off, takes blankets off. He gets cold easily and unable to read pulse ox. On RA sats 88-89%.  When pt gets up with assist his fingers ge

## 2020-10-25 PROCEDURE — 84132 ASSAY OF SERUM POTASSIUM: CPT | Performed by: HOSPITALIST

## 2020-10-25 PROCEDURE — 83735 ASSAY OF MAGNESIUM: CPT | Performed by: HOSPITALIST

## 2020-10-25 PROCEDURE — 80048 BASIC METABOLIC PNL TOTAL CA: CPT | Performed by: INTERNAL MEDICINE

## 2020-10-25 RX ORDER — DOBUTAMINE HYDROCHLORIDE 200 MG/100ML
INJECTION INTRAVENOUS
Status: COMPLETED
Start: 2020-10-25 | End: 2020-10-25

## 2020-10-25 NOTE — PROGRESS NOTES
Eil [address] [place] [address] Cardiology Progress Note        Vedia Romberg Patient Status:  Observation    1955 MRN [id number]   [facility] 2NE-A Attending Eulalia Ricardo MD   Hosp Day # 3 PCP Jacquie Lock MD non-tender. Extremities: 2+ bilateral LE edema. Erythematous, cool  Neurologic: alert and oriented, normal affect. Skin: Warm and dry.            LABS:      HEM:  Recent Labs   Lab 10/18/20  2248 10/19/20  0709 10/22/20  0543 10/23/20  1577 10/24/20  01 and lasix drip since 10/22 - so far, so good. Marked improvement. Weight down 15 pounds.     2. Ischemic Cardiomyopathy, LVEF = 10-15%     3. CAD, s/p ANG 2005. Cath, 2019:  LAD, RCA. Not a candidate for PCI, CABG     4. Abnormal CXR.  CT suggest flu

## 2020-10-25 NOTE — PROGRESS NOTES
[facility] Hospitalist Progress Note                                                                   [address]  2/25/1955  Cc: follow up    SUBJECTIVE: Pt in ICU, on dobutamine and d atorvastatin  80 mg Oral Nightly   • buPROPion HCl ER (XL)  150 mg Oral Daily   • fenofibrate micronized  134 mg Oral QAM AC   • Sertraline HCl  100 mg Oral Daily     Continuous Infusions:   • DOPamine in D5W 3 mcg/kg/min (10/24/20 2128)   • furosemide (LA which was then cx upon admission    **hyponatremia-monitor while diuresing     Prev:  Cont fritz     D/w RN      Thank Liz Dudley MD    [facility] Hospitalist  Answering Service number: [phone]

## 2020-10-25 NOTE — PLAN OF CARE
Assumed patient care this am. Patient denies pain, denies shortness of breath. Dobutamine, dopamine and lasix gtts infusing and continue per Dr. Montana Boyer. Patient diuresing. Patient up to chair with assist x2, gait belt and walker.  Wound care done to [facility]

## 2020-10-25 NOTE — CONSULTS
[facility]  Vascular Surgery Consultation    Vinnievelia Padilla Patient Status:  Inpatient    1955 MRN [id number]   [facility] 6NE-A Attending Geo Weller, *   Hosp Day # 3 PCP Yandel Bardales MD     Reason for Con Intravenous, Continuous  •  ammonium lactate (LAC-HYDRIN) 12 % lotion, , Topical, PRN  •  apixaban (ELIQUIS) tab 5 mg, 5 mg, Oral, BID  •  aspirin chewable tab 81 mg, 81 mg, Oral, Daily  •  atorvastatin (LIPITOR) tab 80 mg, 80 mg, Oral, Nightly  •  buPROPi masses  BACK: normal, no tenderness  SKIN: no rashes, no suspicious lesions, warm and dry  EXTREMITIES: no edema, full range of motion, no tenderness  NEURO/PSYCH: orientated x3, normal mood and affect, no sensory or motor deficit    ARTERIAL VASCULAR EXAM

## 2020-10-25 NOTE — PLAN OF CARE
Patient VSS. No issues with pain or SOB. Continues on Dobutamine and Dopamin gtt. Lasix drip with over 3L output overnight. Pulses per doppler on post tibs. Less swollen than yesterday. Patient able to dangle at bedside with problems.  Updated with pl

## 2020-10-26 PROCEDURE — 83735 ASSAY OF MAGNESIUM: CPT | Performed by: HOSPITALIST

## 2020-10-26 PROCEDURE — 97116 GAIT TRAINING THERAPY: CPT

## 2020-10-26 PROCEDURE — 97110 THERAPEUTIC EXERCISES: CPT

## 2020-10-26 PROCEDURE — 80048 BASIC METABOLIC PNL TOTAL CA: CPT | Performed by: INTERNAL MEDICINE

## 2020-10-26 PROCEDURE — 97164 PT RE-EVAL EST PLAN CARE: CPT

## 2020-10-26 NOTE — PHYSICAL THERAPY NOTE
PHYSICAL THERAPY EVALUATION - INPATIENT     Room Number: 9330/2476-R  Evaluation Date: 10/26/2020  Type of Evaluation: Re-evaluation  Physician Order: PT Eval and Treat    Presenting Problem: BLE cellulitis, hypervolemia, CA-PNA and elevated troponin Layout: One level  Stairs to Enter : 2  Railing: No          Lives With: Alone  Drives: Yes  Patient Owned Equipment: Rolling walker;Cane;Other (Comment)(does not use DME)  Patient Regularly Uses: Reading glasses    Prior Level of [place]: Pt is indep -    ADDITIONAL TESTS                                    NEUROLOGICAL FINDINGS  Neurological Findings: Coordination - Finger to Nose;Coordination - Heel to Shin;Coordination - Rapid Alternating Movement  Coordination - Finger to Nose: Symmetrical  Coordina weakness( B arm raises, elbow flexion/extension, wrist ROM, alt marching, knee extension, hip abd/add, and toe raises. Pt then instructed in proper hand placement and integration of RW.  Pt with Min A for sit<>stand and pt demonstrates a significant lean on troponin. Pertinent comorbidities and personal factors impacting therapy include chronic heart failure c ER 10-15 %, CAD c ANG '05 and Afib.    In this PT evaluation, the patient presents with the following impairments decreased strength, decreased balance device: walker - rolling at assistance level: minimum assistance     Goal #4    Goal #5    Goal #6    Goal Comments: Goals established on 10/26/2020

## 2020-10-26 NOTE — PAYOR COMM NOTE
--------------  CONTINUED STAY REVIEW--------REQUESTING ADDITIONAL DAYS 10/25 AND 10/26      Payor: CONNIE OROURKE  Subscriber #:  [id number]  Authorization Number: [id number]    Admit date: 10/22/20  Admit time: Metsa 36    Admitting Physician: Virginia Santoro BUN 19*   < > 24* 22*  --  16 16 20*   CREATSERUM 1.15   < > 1.27 1.18  --  1.32* 1.37* 1.36*   CA 8.6   < > 8.7 8.5  --  8.2* 8.7 8.3*   MG 2.3  --  2.4  --  2.5  --   --  2.4   GLU 78   < > 76 89  --  321* 224* 236*    < > = values in this interval not d - dense consolidation noted but clinically not behaving like pna (no cough, fever, leukocytosis)  - CT consistent w fluid/procal neg- no evidence of pna. Dced abx.   Cont diuresis     # tobacco dependence  -encourage cessation     # p afib- no acute issue Last data filed at 10/26/2020 0700      Gross per 24 hour   Intake 2426 ml   Output 5700 ml   Net -3274 ml      Wt Readings from Last 3 Encounters:  10/26/20 : 182 lb 12.2 oz (82.9 kg)  09/24/20 : 194 lb (88 kg)  09/22/20 : 201 lb (91.2 kg)        Physical No results for input(s): PT, PTT, INR in the last 168 hours.                 Lab Results   Component Value Date     TROP 0.067 () 10/19/2020     TROP 0.074 () 10/18/2020            Invalid input(s): PBNPML                       Telemetry: SR Terrance Ahmadi 10/25/2020 2032 Given 5 mg Oral Gee Caal RN      aspirin chewable tab 81 mg     Date Action Dose Route User    10/26/2020 0940 Given 81 mg Oral Ely Route, RN      atorvastatin (LIPITOR) tab 80 mg     Date Action Dose Route User    10/2 10/25/2020 2200 Rate/Dose Verify 3 mcg/kg/min × 98.2 kg (Dosing Weight) Intravenous Scheryl Lines, RN    10/25/2020 2033 New Bag 3 mcg/kg/min × 98.2 kg (Dosing Weight) Intravenous Scheryl Lines, RN    10/25/2020 2000 Rate/Dose Verify 3 mcg/k

## 2020-10-26 NOTE — PROGRESS NOTES
Eli [address] [place] [address] Cardiology Progress Note        Leann Schuster Patient Status:  Observation    1955 MRN [id number]   [facility] 2NE-A Attending Monico Monteiro MD   Hosp Day # 4 PCP Yamilet Joyner MD edema.  Erythematous, cool  Neurologic: alert and oriented, normal affect. Skin: Warm and dry. LABS:      HEM:  Recent Labs   Lab 10/22/20  0543 10/23/20  0635 10/24/20  0113   WBC 5.7 4.6 5.4   HGB 13.8 13.0 13.4   HCT 43.4 40.9 41.1   . fluid in fissure rather than PNA/mass     5. Elevated troponin. Plateau pattern. No clinical evidence for ACS     6. Dyslipidemia      7. PVD     8. s/p ICD     9. Paroxysmal Atrial Fibrillation, on eliquis     10. Hypertension  .   Now hypotensive 2/2 Card

## 2020-10-26 NOTE — PROGRESS NOTES
[facility] Hospitalist Progress Note     [facility]      SUBJECTIVE:  No CP, SOB, or N/V.   Wt down and diuresing well    OBJECTIVE:  Temp:  [97.2 °F (36.2 °C)-98 °F (36.7 °C)] 97.9 °F (36.6 °C)  Pulse:  [65-84] 77  Resp:  [12-23] 22  BP: ()/(52-49) 9 Abnormal chest x-ray. TECHNIQUE:  CT images were obtained with non-ionic intravenous contrast material. Dose reduction techniques were used.  Dose information is transmitted to the [facility] ([address] [place] [address] of Radiology) Jake Jorgensen 36 ([address]) changes of the spine. There is a proc rounded compression fracture involving T12. CONCLUSION:   1. Moderate size right pleural effusion with most of the fluid trapped within the major fissure causing the x-ray abnormality.   2. There is mild in RIGHT POPLITEAL DISTAL:      26 TIBIOPER TRUNK:      Not visualized RIGHT PTA PROX:      20 RIGHT PTA MID:      15 RIGHT PTA DISTAL:      16 RIGHT VICENTE PROX:      17 RIGHT VICENTE MID:      18  RIGHT DORSALIS PEDIS:      23 RIGHT GREAT TOE PRESSURE:      Not vi scrotum is noted. EPIDIDYMIS:  Negative. HYDROCELE:  Negative VARICOCELE:  Negative OTHER:  Negative. CONCLUSION:  Markedly thickened edematous scrotum is noted. No evidence of torsion.     Dictated by (CST): Kamille Finley MD on 10/22/202 AM          Meds:   No current outpatient medications on file.         •  cyclobenzaprine (FLEXERIL) tab 5 mg, 5 mg, Oral, TID PRN    •  DOPamine in D5W (INTROPIN) 800 mg/250 ml premix infusion, 3 mcg/kg/min (Dosing Weight), Intravenous, Continuous    •  fu diuresing well --> negative 3.5 L yesterday, overall down 17.5 L since admit  - per chart review, pt deemed NOT a candidate for heart transplant d/t noncompliance  - has ICD in place     # elevated trop  # ho CAD sp ANG  - hx of CAD s/p ANG 2005, also know

## 2020-10-26 NOTE — PLAN OF CARE
Problem: Impaired Activities of Daily Living  Goal: Achieve highest/safest level of independence in self care  Description: Interventions:  - Assess ability and encourage patient to participate in ADLs to maximize function  - Promote sitting position [facility]

## 2020-10-26 NOTE — CM/SW NOTE
Care Progression Note:  Active Acute Medical Issue:   Hypervolemia, unspecified hypervolemia type-lasix gtt dc'd today. Dobs & dopamine gtts remain. Diuresing well.      Other Contributing Medical Factors/Dx.:   CHF, CAD, depression, heart attack, HTN, HLD,

## 2020-10-26 NOTE — PLAN OF CARE
Assumed care of pt at 0730. Pt received on Lasix drip 10mg/hr, Dopamine 3mcg/kg/min, and Dobutamine 7.5mcg/kg/min. Pt seen by Dr Luna Orozco for Cardiology. Orders received to d/c lasix drip.  B/P in low 94K systolic early this am, but dropped to upper 70s

## 2020-10-27 PROCEDURE — 80053 COMPREHEN METABOLIC PANEL: CPT | Performed by: INTERNAL MEDICINE

## 2020-10-27 PROCEDURE — 85025 COMPLETE CBC W/AUTO DIFF WBC: CPT | Performed by: INTERNAL MEDICINE

## 2020-10-27 PROCEDURE — 84100 ASSAY OF PHOSPHORUS: CPT | Performed by: INTERNAL MEDICINE

## 2020-10-27 PROCEDURE — 83735 ASSAY OF MAGNESIUM: CPT | Performed by: INTERNAL MEDICINE

## 2020-10-27 RX ORDER — BISACODYL 10 MG
10 SUPPOSITORY, RECTAL RECTAL
Status: DISCONTINUED | OUTPATIENT
Start: 2020-10-27 | End: 2020-10-30

## 2020-10-27 RX ORDER — SENNA AND DOCUSATE SODIUM 50; 8.6 MG/1; MG/1
2 TABLET, FILM COATED ORAL 2 TIMES DAILY
Status: DISCONTINUED | OUTPATIENT
Start: 2020-10-27 | End: 2020-10-30

## 2020-10-27 RX ORDER — POLYETHYLENE GLYCOL 3350 17 G/17G
17 POWDER, FOR SOLUTION ORAL DAILY PRN
Status: DISCONTINUED | OUTPATIENT
Start: 2020-10-27 | End: 2020-10-30

## 2020-10-27 RX ORDER — TORSEMIDE 20 MG/1
40 TABLET ORAL
Status: DISCONTINUED | OUTPATIENT
Start: 2020-10-27 | End: 2020-10-28

## 2020-10-27 RX ORDER — MIDODRINE HYDROCHLORIDE 5 MG/1
5 TABLET ORAL 3 TIMES DAILY
Status: DISCONTINUED | OUTPATIENT
Start: 2020-10-27 | End: 2020-10-30

## 2020-10-27 NOTE — PAYOR COMM NOTE
--------------  CONTINUED STAY REVIEW--------REQUESTING ADDITIONAL DAY 10/27      Payor: [facility] YVONNE CHARLES  Subscriber #:  [id number]  Authorization Number: [id number]    Admit date: 10/22/20  Admit time: 1134    Admitting Physician: Anthony Da Silva MD  Att    < >  --  95* 96* 95* 94* 100  100   CO2 27.0   < >  --  31.0 31.0 33.0* 31.0 31.0  31.0   BUN 24*   < >  --  16 16 20* 19* 22*  22*   CREATSERUM 1.27   < >  --  1.32* 1.37* 1.36* 1.23 1.06  1.06   CA 8.7   < >  --  8.2* 8.7 8.3* 8.6 8.8  8.8   MG PROCEDURE:  CT CHEST(CONTRAST ONLY) (CPT=71260)  COMPARISON:  [facility] , XR, XR CHEST AP PORTABLE  (CPT=71045), 10/18/2020, 11:52 PM.  INDICATIONS:  Abnormal chest x-ray.   TECHNIQUE:  CT images were obtained with non-ionic intravenous contrast material. Dose subcutaneous tissue which likely is due to mild diffuse edema. There is trace amount of ascites. BONES:  No bony lesion or fracture. Mild degenerative changes of the spine.   There is a proc rounded compression fracture involving T12.             CONCLUSI PROCEDURE:  US ARTERIAL DUPLEX LOWER EXTREMITY BILATERAL (CPT=93925)  COMPARISON:  None. INDICATIONS:  Cold extremities, peripheral  TECHNIQUE:  A comprehensive color duplex Doppler ultrasound examination of the bilateral lower extremities was performed. 32 LEFT VICENTE PROX:      50 LEFT VICENTE MID:      43  LEFT DORSALIS PEDIS[de-identified]      29 LEFT GREAT TOE PRESSURE:      Not visualized              CONCLUSION:   1. No flow in the proximal right superficial femoral artery.   Markedly diminished flow is noted in the mi PROCEDURE:  XR CHEST AP PORTABLE  (CPT=71045)  TECHNIQUE:  AP chest radiograph was obtained.   COMPARISON:  [facility] , XR, XR CHEST AP PORTABLE  (CPT=71045), 10/18/2020, 11:52 PM.  INDICATIONS:  picc  placement  PATIENT STATED HISTORY: (As transcribed by Sticher •  ammonium lactate (LAC-HYDRIN) 12 % lotion, , Topical, PRN     •  apixaban (ELIQUIS) tab 5 mg, 5 mg, Oral, BID     •  aspirin chewable tab 81 mg, 81 mg, Oral, Daily     •  atorvastatin (LIPITOR) tab 80 mg, 80 mg, Oral, Nightly     •  buPROPion HCl ER (XL - elevation NOT consistent w ACS     # abnormal cxr  - dense consolidation noted but clinically not behaving like pna (no cough, fever, leukocytosis)  - CT consistent w fluid/procal neg- no evidence of pna.  Dced abx.  Cont diuresis  - on room air     # Mi DOBUTamine in D5W (DOBUTREX) 500 mg/250 ml infusion     Date Action Dose Route User    10/27/2020 0900 Rate/Dose Change 5 mcg/kg/min × 100.5 kg Intravenous Natalie Orlando RN    10/27/2020 1387 New Bag 7.5 mcg/kg/min × 100.5 kg Intravenous Marni Hernadez, 10/27/2020 0000 Rate/Dose Verify 3 mcg/kg/min × 98.2 kg (Dosing Weight) Intravenous Jaswinder Ramirez RN    10/26/2020 2200 Rate/Dose Verify 3 mcg/kg/min × 98.2 kg (Dosing Weight) Intravenous Jaswinder Ramirez RN    10/26/2020 2155 New Bag 3 mcg/k

## 2020-10-27 NOTE — PLAN OF CARE
Pt doing well today,Dobutamine infusion dose decreased to 5mcg/kg/min, and pt  able to weaan off Dopamine after Proamatine started. Pt denies symptoms of hypoperfusion. Pt has doppler pedal pulses. PT ambulated 100 feet in the sheriff on room air.  No shortnes

## 2020-10-27 NOTE — CONSULTS
.[address] Patient Status:  Inpatient    1955 MRN [id number]   [facility] 6NE-A Attending Marcela Upton MD   [facility] Day # 5 PCP Dedra Guzmán MD     Patient Identification  Jose Pierce is a 60 distribution   • Depression    • HEART ATTACK    • HYPERTENSION    • HYPERTRIGLYCERIDEMIA    • Other and unspecified hyperlipidemia    • Psychiatric disorder     ANXIETY   • Unspecified essential hypertension    • Visual impairment        Past Surgical His BID    •  aspirin chewable tab 81 mg, 81 mg, Oral, Daily    •  atorvastatin (LIPITOR) tab 80 mg, 80 mg, Oral, Nightly    •  buPROPion HCl ER (XL) (WELLBUTRIN XL) 150 MG 24 hr tab 150 mg, 150 mg, Oral, Daily    •  diphenhydrAMINE HCl (BENADRYL) cap 50 mg, 5 ALKPHO 181 10/27/2020    BILT 2.4 10/27/2020    TP 7.5 10/27/2020    AST 27 10/27/2020    ALT 15 10/27/2020    MG 2.7 10/27/2020    PHOS 3.5 10/27/2020       Review of Systems:  Complete review of systems performed and negative except as that outlined i secondary to Acute on Chronic Systolic CHF    Cardiogenic shock    Severe Ischemic Cardiomyopathy with  LVEF 10-15%    PVD    Scrotal edema      PLAN:                   Based on patient's low EF of 10% and his current functional status, pt would benefit fr

## 2020-10-27 NOTE — PROGRESS NOTES
[facility] Hospitalist Progress Note     [facility]      SUBJECTIVE:  Feeling fine  Denies SOB, no swelling  No CP  Weaning down dopamine this morning    OBJECTIVE:  Temp:  [96.8 °F (36 °C)-98.6 °F (37 °C)] 96.8 °F (36 °C)  Pulse:  [69-80] 69  Resp:  [12-2 2.6*       Recent Labs   Lab 10/24/20  1458   PGLU 104*       Imaging:  Ct Chest(contrast Only) (cpt=71260)    Result Date: 10/19/2020  PROCEDURE:  CT CHEST(CONTRAST ONLY) (CPT=71260)  COMPARISON:  [facility] , XR, XR CHEST AP PORTABLE  (CPT=71045), 10/18/2020 WALL:  No mass or axillary adenopathy. LIMITED ABDOMEN:  There is stranding of the abdominal fat as well as the abdominal wall subcutaneous tissue which likely is due to mild diffuse edema. There is trace amount of ascites.  BONES:  No bony lesion or frac dorsalis pedis arteries. OTHER:  None.   SYSTOLIC VELOCITIES (CM/S): RIGHT COMMON FEMORAL:      104  RIGHT PROFUNDA FEMORAL:      61    RIGHT SUPERFICIAL FEMORAL PROX:    0 RIGHT SUPERFICIAL FEMORAL MID:    31 RIGHT SUPERFICIAL FEMORAL DISTAL:    21 RIGHT P transcribed by Technologist)     FINDINGS:  TESTES:  The right testicle measures 4.3 x 3.4 x 2.9 cm. The left testicle measures 3.6 x 2.4 x 2.6 cm. Marked scrotal edema. Scrotal skin is thickened. Arterial and venous flow in both testicles is noted.   Franklin Posadas document clearing and to rule out underlying mass. 2. Cardiomegaly and mild pulmonary vascular congestion. 3. Possible small bilateral pleural effusions.     Dictated by (CST): Iris Walker MD on 10/19/2020 at 7:59 AM     Finalized by (CST): Iris Walker, need for surgical intervention  - on dobutamine and dopamine gtt.  On lasix gtt --> appreciate cardiology recs, stopping lasix IV and trying to wean dopamine as blood pressure tolerates  - diuresing well --> negative 3.5 L yesterday, overall down 17.5 L sin

## 2020-10-27 NOTE — PROGRESS NOTES
[facility] Cardiology Progress Note        Benny Purpura Patient Status:  Observation    1955 MRN [id number]   [facility] 2NE-A Attending Nigel Hernadez MD   Hosp Day # 5 PCP Chloe Newell MD alert and oriented, normal affect. Skin: Warm and dry.            LABS:      HEM:  Recent Labs   Lab 10/22/20  0543 10/23/20  0635 10/24/20  0113 10/27/20  0527   WBC 5.7 4.6 5.4 3.6*   HGB 13.8 13.0 13.4 14.4   HCT 43.4 40.9 41.1 43.0   .0* 95.0* 9 Marked improvement. Weight down > 30  pounds.     2. Ischemic Cardiomyopathy, LVEF = 10-15%     3. CAD, s/p ANG 2005. Cath, 2019:  LAD, RCA. Not a candidate for PCI, CABG     4. Abnormal CXR. CT suggest fluid in fissure rather than PNA/mass     5.  El

## 2020-10-27 NOTE — DIETARY NOTE
[facility]    NUTRITION ASSESSMENT    Pt does not meet malnutrition criteria.     NUTRITION DIAGNOSIS/PROBLEM:    Food and nutrition-related knowledge deficit related to lack of value for nutrition related recommendations as evidenced by pt verbalizing n/a    FOOD/NUTRITION RELATED HISTORY:  Appetite: Good  Intake: 100%  Intake Meeting Needs: Yes  Food Allergies: No  Cultural/Ethnic/Yazdanism Preferences Addresses: Yes    NUTRITION RELATED PHYSICAL FINDINGS:     1. Body Fat/Muscle Mass: well nourished pe

## 2020-10-28 ENCOUNTER — APPOINTMENT (OUTPATIENT)
Dept: CV DIAGNOSTICS | Facility: HOSPITAL | Age: 65
DRG: 286 | End: 2020-10-28
Attending: INTERNAL MEDICINE
Payer: COMMERCIAL

## 2020-10-28 PROCEDURE — 85025 COMPLETE CBC W/AUTO DIFF WBC: CPT | Performed by: INTERNAL MEDICINE

## 2020-10-28 PROCEDURE — 93306 TTE W/DOPPLER COMPLETE: CPT | Performed by: INTERNAL MEDICINE

## 2020-10-28 PROCEDURE — 99255 IP/OBS CONSLTJ NEW/EST HI 80: CPT | Performed by: INTERNAL MEDICINE

## 2020-10-28 PROCEDURE — 97116 GAIT TRAINING THERAPY: CPT

## 2020-10-28 PROCEDURE — 80048 BASIC METABOLIC PNL TOTAL CA: CPT | Performed by: INTERNAL MEDICINE

## 2020-10-28 RX ORDER — TORSEMIDE 20 MG/1
40 TABLET ORAL DAILY
Status: DISCONTINUED | OUTPATIENT
Start: 2020-10-29 | End: 2020-10-30

## 2020-10-28 RX ORDER — POTASSIUM CHLORIDE 20 MEQ/1
40 TABLET, EXTENDED RELEASE ORAL EVERY 4 HOURS
Status: COMPLETED | OUTPATIENT
Start: 2020-10-28 | End: 2020-10-28

## 2020-10-28 NOTE — PLAN OF CARE
Problem: CARDIOVASCULAR - ADULT  Goal: Maintains optimal cardiac output and hemodynamic stability  Description: INTERVENTIONS:  - Monitor vital signs, rhythm, and trends  - Monitor for bleeding, hypotension and signs of decreased cardiac output  - Evalua encourage patient to participate in ADLs to maximize function  - Promote sitting position while performing ADLs such as feeding, grooming, and bathing  - Educate and encourage patient/family in tolerated functional activity level and precautions during ramon

## 2020-10-28 NOTE — PLAN OF CARE
Assume patient care this am. Patient denies pain, denies shortness of breath. Patient up to chair and bathroom with assistance. Ambulated in sheriff with physical therapy. Will continue to monitor closely.      Problem: CARDIOVASCULAR - ADULT  Goal: Maintains

## 2020-10-28 NOTE — CM/SW NOTE
Met with patient at bedside to discuss PT and PMR MD recommendation. PMR MD recommending [facility]. Patient declines going to \"another facility\". He states he wants to go home. CM offered C.  Patient is agreeable to [facility] but would like to decide on [facility] comp

## 2020-10-28 NOTE — PLAN OF CARE
Assumed care @ 1930. Up in chair. Denies c/o CP SOB or discomfort. Resp easy unlabored on RA. Rales present to R. 02 sat 98. Improved BLE edema pedal/post tib via doppler. Legs remain kendal to cyanotic cool improved during bedrest. Dobs cont at 5mcqs.  [place]

## 2020-10-28 NOTE — PAYOR COMM NOTE
--------------  CONTINUED STAY REVIEW------REQUESTING ADDITIONAL DAY 10/28      Payor: [facility]  Subscriber #:  [id number]  Authorization Number: [id number]    Admit date: 10/22/20  Admit time: 1134    Admitting Physician: Nel House MD  Atten BP: 96/68 (!) 83/44 (!) 74/31 (!) 88/51   BP Location:   Left arm       Pulse: 73 72 70 74   Resp: 18 19 14 17   Temp:   97.5 °F (36.4 °C)       TempSrc:   Temporal       SpO2: 100% 100% 100% 100%   Weight:           Height:                 Temp:  [96.7 °F AST 25 27 27   ALB 2.4* 2.5* 2.6*         No results for input(s): PT, PTT, INR in the last 168 hours.                 Lab Results   Component Value Date     TROP 0.067 () 10/19/2020     TROP 0.074 () 10/18/2020            Invalid input(s): PBNPML    10/27/2020 2055 Given 5 mg Oral Virgen Ram RN      aspirin chewable tab 81 mg     Date Action Dose Route User    10/28/2020 7297 Given 81 mg Oral Margarita Colon RN      atorvastatin (LIPITOR) tab 80 mg     Date Action Dose Route User    10 fenofibrate micronized (LOFIBRA) cap 134 mg     Date Action Dose Route User    10/28/2020 0649 Given 134 mg Oral Wilbur Hazel RN      Midodrine HCl (PROAMATINE) tab 5 mg     Date Action Dose Route User    10/28/2020 1560 Given 5 mg Oral Erick

## 2020-10-28 NOTE — PROGRESS NOTES
[facility] Hospitalist Progress Note     [facility]      SUBJECTIVE:  No acute events overnight  Ambulated    OBJECTIVE:  Temp:  [96.7 °F (35.9 °C)-97.9 °F (36.6 °C)] 97.5 °F (36.4 °C)  Pulse:  [64-81] 68  Resp:  [13-27] 16  BP: ()/(31-83) 84/57    I 10/27/20  0527   ALT 14* 14* 15*   AST 25 27 27   ALB 2.4* 2.5* 2.6*       Recent Labs   Lab 10/24/20  1458   PGLU 104*       Imaging:  Ct Chest(contrast Only) (cpt=71260)    Result Date: 10/19/2020  PROCEDURE:  CT CHEST(CONTRAST ONLY) (CPT=71260)  COMPARI layering left pleural effusion. THORACIC AORTA:  No aneurysm. CHEST WALL:  No mass or axillary adenopathy.   LIMITED ABDOMEN:  There is stranding of the abdominal fat as well as the abdominal wall subcutaneous tissue which likely is due to mild diffuse luana the left posterior tibial and left anterior tibial along with left dorsalis pedis arteries. OTHER:  None.   SYSTOLIC VELOCITIES (CM/S): RIGHT COMMON FEMORAL:      104  RIGHT PROFUNDA FEMORAL:      61    RIGHT SUPERFICIAL FEMORAL PROX:    0 RIGHT SUPERFICIAL analysis were also performed. PATIENT STATED HISTORY: (As transcribed by Technologist)     FINDINGS:  TESTES:  The right testicle measures 4.3 x 3.4 x 2.9 cm. The left testicle measures 3.6 x 2.4 x 2.6 cm. Marked scrotal edema.   Scrotal skin is thickene most consistent with pneumonia. Follow-up is recommended to document clearing and to rule out underlying mass. 2. Cardiomegaly and mild pulmonary vascular congestion. 3. Possible small bilateral pleural effusions.     Dictated by (CST): Edi Diaz MD on PRN         Assessment/Plan:     Patient is a 60year old male with PMH sig for CAD, ischemic cardiomyopathy EF 10-15%, ho ICD, PVD, SONIDO, HTN, depression who presents with worsening ble edema/scrotal edema.       # cardiogenic shock  # acute on chronic syst abx given since 10/20     # hyponatremia  - mild, monitor with diuresis     Prophy:  DVT: eliquis  Deconditioning prevention: PT/OT    Dispo: admitted to CCU, dopamine weaned, on dobutamine and midodrine    Shannane Heimlich  Internal Medicine  [facility]

## 2020-10-28 NOTE — PROGRESS NOTES
[facility] [address] [place] [address] Cardiology Progress Note        Daclaribel Monahan Patient Status:  Observation    1955 MRN [id number]   [facility] 2NE-A Attending Haley Aragon MD   Hosp Day # 6 PCP Reanna Salazar MD rub, click. No murmur. Lungs: Clear to auscultation and percussion. : no scrotal edema  Abdomen: Soft, non-tender. Extremities: no  bilateral LE edema. Erythematous, warm  Neurologic: alert and oriented, normal affect. Skin: Warm and dry. pulmonary vascular congestion. 3. Possible small bilateral pleural effusions.              Impression:     1. Acute-on-chronic systolic  CHF. Seemed to be triggered by not using diuretic Rx for 1-2 weeks PTA. Now exhibiting signs of poor cardiac output.

## 2020-10-28 NOTE — PHYSICAL THERAPY NOTE
PHYSICAL THERAPY TREATMENT NOTE - INPATIENT    Room Number: 5353/7517-M     Session: 1   Number of Visits to Meet Established Goals: 3    Presenting Problem: Cardiogenic Shock, Heart Failure, Cardiomyopathy     History related to current admission: Pt was TOLERANCE  Blood Pressure: Sitting 87/67      AM-PAC '6-Clicks' INPATIENT SHORT FORM - BASIC MOBILITY  How much difficulty does the patient currently have. ..  -   Turning over in bed (including adjusting bedclothes, sheets and blankets)?: A Little   -   Si questions and concerns addressed; Alarm set    ASSESSMENT   The pt exhibits improving strength, balance, and endurance as he requires decreased assistance for transfers and ambulation and is able to significantly increase ambulation distance.   Given the pt'

## 2020-10-28 NOTE — CONSULTS
[address] [facility] CONSULT      Patient: Olga Lidia Vance Date: 10/28/2020     : 1955 Attending: Alexandra De Jesus MD   60year old male Requested by: Dr. Claudio Joe     A/P:  Acute on chronic HFrEF, LVEF 10-15% due to ICM, NYHA Class history and management plans and answering questions about advanced HF options including VAD/transplant/inotropes.     Discussed case with Dr. Torey De La Rosa and nursing staff    Adán Lugo MD  Advanced Heart Failure  [facility]      Chief Complain chest pain/tightness. He was having orthopnea/PND but this seems to have improved. He currently notices some LH, dizziness but denies falls/syncope.     Past Medical History:   Diagnosis Date   • CHF (congestive heart failure) (HCC)    • CORONARY ARTERY DIS organizations: Not on file        Relationship status: Not on file      Intimate partner violence        Fear of current or ex partner: Not on file        Emotionally abused: Not on file        Physically abused: Not on file        Forced sexual activity: Intake/Output:  I/O this shift:  In: 360 [P.O.:360]  Out: 675 [Urine:675]  I/O last 3 completed shifts: In: 0953 [P.O.:1170;  I.V.:1155]  Out: 1018 [Urine:3450]    Intake/Output Summary (Last 24 hours) at 10/28/2020 1812  Last data filed at 10/28/20 noted in      right atrium. Impressions:  This study is compared with previous dated 02/13/2015: Left   anterior descending coronary distribution left ventricle dysfunction is new   from prior.

## 2020-10-29 PROCEDURE — 85025 COMPLETE CBC W/AUTO DIFF WBC: CPT | Performed by: INTERNAL MEDICINE

## 2020-10-29 PROCEDURE — 80048 BASIC METABOLIC PNL TOTAL CA: CPT | Performed by: INTERNAL MEDICINE

## 2020-10-29 PROCEDURE — 80076 HEPATIC FUNCTION PANEL: CPT | Performed by: INTERNAL MEDICINE

## 2020-10-29 RX ORDER — SODIUM CHLORIDE 9 MG/ML
INJECTION, SOLUTION INTRAVENOUS
Status: COMPLETED | OUTPATIENT
Start: 2020-10-30 | End: 2020-10-30

## 2020-10-29 NOTE — CM/SW NOTE
Met with patient to discuss his [facility] choice. He states he has not looked at his email in a couple of days and has not really thought about home health. CM discussed possible need for home IV infusion and home health.  CM also explained role of discharge pl

## 2020-10-29 NOTE — PROGRESS NOTES
[address] Hospitalist Progress Note     [facility]      SUBJECTIVE:   no new complaints. Denies cp and sob.  Otherwise feels the same    OBJECTIVE:  Temp:  [97.5 °F (36.4 °C)-98 °F (36.7 °C)] 98 °F (36.7 °C)  Pulse:  [] 65  Resp:  [12-30] 22  BP: (80 XR, XR CHEST AP PORTABLE  (CPT=71045), 10/18/2020, 11:52 PM.  INDICATIONS:  Abnormal chest x-ray. TECHNIQUE:  CT images were obtained with non-ionic intravenous contrast material. Dose reduction techniques were used.  Dose information is transmitted to the amount of ascites. BONES:  No bony lesion or fracture. Mild degenerative changes of the spine. There is a proc rounded compression fracture involving T12. CONCLUSION:   1.   Moderate size right pleural effusion with most of the fluid trapped wi RIGHT SUPERFICIAL FEMORAL DISTAL:    21 RIGHT POPLITEAL PROX:      16 RIGHT POPLITEAL DISTAL:      26 TIBIOPER TRUNK:      Not visualized RIGHT PTA PROX:      20 RIGHT PTA MID:      15 RIGHT PTA DISTAL:      16 RIGHT VICENTE PROX:      17 RIGHT VICENTE MID:      1 venous flow in both testicles is noted. Marked vascularity in the scrotum is noted. EPIDIDYMIS:  Negative. HYDROCELE:  Negative VARICOCELE:  Negative OTHER:  Negative. CONCLUSION:  Markedly thickened edematous scrotum is noted.   No evidence o AM     Finalized by (CST): Marco Antonio Whitney MD on 10/19/2020 at 8:00 AM          Meds:   No current outpatient medications on file.         •  torsemide (DEMADEX) tab 40 mg, 40 mg, Oral, Daily    •  Senna-Docusate Sodium (SENOKOT S) 8.6-50 MG tab 2 tablet, 2 t US scrotum with doppler noted- vascular had been consulted, appreciate- no need for surgical intervention  -was on lasix gtt- now on PO torsemide- diuresing well --> overall neg 13L  - per chart review, pt deemed NOT a candidate for heart transplant d/t no

## 2020-10-29 NOTE — PLAN OF CARE
Assumed patient care this am. Patient alert, oriented x4. Denies pain. Denies shortness of breath. Dobutamine gtt titrated off per cardiology. Ambulating in halls with walker and gait belt, assist x1. Will continue to monitor.

## 2020-10-29 NOTE — PLAN OF CARE
Assumed care of this patient at [address] [place] [address], up in chair. A&O x 4, in no distress. Denies complaints of pain/discomfort. Remains on dobutamine at 5 mcg. Orthostatic blood pressure done this morning; see flowsheet. No acute events overnight.  Updated regarding plan difficulty  - Respiratory Therapy support as indicated  - Manage/alleviate anxiety  - Monitor for signs/symptoms of CO2 retention  Outcome: Progressing     Problem: Impaired Functional Mobility  Goal: Achieve highest/safest level of mobility/gait  Descript

## 2020-10-30 ENCOUNTER — APPOINTMENT (OUTPATIENT)
Dept: INTERVENTIONAL RADIOLOGY/VASCULAR | Facility: HOSPITAL | Age: 65
DRG: 286 | End: 2020-10-30
Attending: INTERNAL MEDICINE
Payer: COMMERCIAL

## 2020-10-30 VITALS
HEART RATE: 83 BPM | DIASTOLIC BLOOD PRESSURE: 61 MMHG | TEMPERATURE: 98 F | WEIGHT: 176.38 LBS | BODY MASS INDEX: 23.89 KG/M2 | HEIGHT: 72 IN | OXYGEN SATURATION: 100 % | SYSTOLIC BLOOD PRESSURE: 98 MMHG | RESPIRATION RATE: 18 BRPM

## 2020-10-30 PROCEDURE — 99152 MOD SED SAME PHYS/QHP 5/>YRS: CPT

## 2020-10-30 PROCEDURE — 4A033B3 MEASUREMENT OF ARTERIAL PRESSURE, PULMONARY, PERCUTANEOUS APPROACH: ICD-10-PCS | Performed by: INTERNAL MEDICINE

## 2020-10-30 PROCEDURE — 4A033BC MEASUREMENT OF ARTERIAL PRESSURE, CORONARY, PERCUTANEOUS APPROACH: ICD-10-PCS | Performed by: INTERNAL MEDICINE

## 2020-10-30 PROCEDURE — 4A023N6 MEASUREMENT OF CARDIAC SAMPLING AND PRESSURE, RIGHT HEART, PERCUTANEOUS APPROACH: ICD-10-PCS | Performed by: INTERNAL MEDICINE

## 2020-10-30 PROCEDURE — 80048 BASIC METABOLIC PNL TOTAL CA: CPT | Performed by: INTERNAL MEDICINE

## 2020-10-30 PROCEDURE — 93451 RIGHT HEART CATH: CPT

## 2020-10-30 RX ORDER — TORSEMIDE 20 MG/1
40 TABLET ORAL DAILY
Qty: 60 TABLET | Refills: 5 | Status: SHIPPED | OUTPATIENT
Start: 2020-10-30

## 2020-10-30 RX ORDER — MIDODRINE HYDROCHLORIDE 2.5 MG/1
2.5 TABLET ORAL 3 TIMES DAILY
Qty: 90 TABLET | Refills: 3 | Status: SHIPPED | OUTPATIENT
Start: 2020-10-30

## 2020-10-30 RX ORDER — MIDAZOLAM HYDROCHLORIDE 1 MG/ML
INJECTION INTRAMUSCULAR; INTRAVENOUS
Status: COMPLETED
Start: 2020-10-30 | End: 2020-10-30

## 2020-10-30 RX ORDER — MIDODRINE HYDROCHLORIDE 2.5 MG/1
2.5 TABLET ORAL 3 TIMES DAILY
Status: DISCONTINUED | OUTPATIENT
Start: 2020-10-30 | End: 2020-10-30

## 2020-10-30 RX ORDER — LIDOCAINE HYDROCHLORIDE 10 MG/ML
INJECTION, SOLUTION EPIDURAL; INFILTRATION; INTRACAUDAL; PERINEURAL
Status: COMPLETED
Start: 2020-10-30 | End: 2020-10-30

## 2020-10-30 RX ORDER — HEPARIN SODIUM 5000 [USP'U]/ML
INJECTION, SOLUTION INTRAVENOUS; SUBCUTANEOUS
Status: COMPLETED
Start: 2020-10-30 | End: 2020-10-30

## 2020-10-30 NOTE — PAYOR COMM NOTE
--------------  CONTINUED STAY REVIEW    Payor: CONNIE CHARLES  Subscriber #:  [id number]  Authorization Number: [id number]    Admit date: 10/22/20  Admit time: 46    Admitting Physician: Henok Brizuela MD  Attending Physician:  Tanvir Wichita Signed   Date of Service:  10/29/2020  7:04 AM               Signed        Expand AllCollapse All                  [facility] Cardiology Progress Note                 Renee Fox Patient Status:  Observation    1955 MRN [id number] Neck: No JVD, carotids 2+ no bruits. Cardiac: Regular S1S2. No S3, S4, rub, click. No murmur. Lungs: Clear to auscultation and percussion. : no scrotal edema  Abdomen: Soft, non-tender. Extremities: no  bilateral LE edema.   Erythematous, warm  Renan 1. Confluent opacity in right lower lung most consistent with pneumonia.  Follow-up is recommended to document clearing and to rule out underlying mass. 2. Cardiomegaly and mild pulmonary vascular congestion.   3. Possible small bilateral pleural effusions Discussed case with Dr. Camilla Uriostegui.   We will attempt to wean off dobutamine today and perform right heart cath in am (off eliquis) to help guide medical Rx going forward including the possibility of chronic IV inotrope therapy                        Electronic BP: 90/67 96/62 106/74 93/70   BP Location: Left arm         Pulse: 91 66 73 67   Resp: 22 16 14 14   Temp: 97.3 °F (36.3 °C)         TempSrc: Temporal         SpO2: 100% 100% 100% 100%   Weight:     176 lb 6.4 oz (80 kg)     Height:                    Tem No results for input(s): PT, PTT, INR in the last 168 hours.                 Lab Results   Component Value Date     TROP 0.067 () 10/19/2020     TROP 0.074 () 10/18/2020            Invalid input(s): PBNPML                       Telemetry: SR Francine Najjar 3. CAD, s/p ANG 2005.  Cath, 2019:  LAD, RCA.  Not a candidate for PCI, CABG     4. Abnormal CXR. CT suggest fluid in fissure rather than PNA/mass     5. Elevated troponin. Plateau pattern. No clinical evidence for ACS     6. Dyslipidemia      7.  PVD

## 2020-10-30 NOTE — PLAN OF CARE
Admitted  from cath lab s/p right heart cath,pt from CNICU  Right groin stable dry and intact, manual hold  Pt drowsy but arousable  Denies any pain,no sob  Bedrest x2 hrs, bilateral legs PVD ,reddened   Due to urinate  Will monitor, d/c plan later

## 2020-10-30 NOTE — DISCHARGE SUMMARY
General Medicine Discharge Summary     Patient ID:  Vania Sheridan  60year old  2/25/1955    Admit date: 10/18/2020    Discharge date and time: 10/30/20    Attending Physician: Cleta Cranker, pt deemed NOT a candidate for heart transplant d/t noncompliance  - has ICD in place  - weaned off dobutamine, RHC done 10/30  The mean right atrial pressure is 9, RV pressure 49/8, PA pressure 46/21 with a mean of 34. Mean wedge pressure is 23.   Jairo car medications which have CHANGED    torsemide 20 MG Oral Tab  Take 2 tablets (40 mg total) by mouth daily.  Sometimes takes 3 a day      CONTINUE these medications which have NOT CHANGED    acetaminophen 500 MG Oral Tab  Take 500 mg by mouth every 6 (six) robinson

## 2020-10-30 NOTE — PHYSICAL THERAPY NOTE
Attempted to see Pt this PM - RN aware of attempt. Pt requesting to rest, as he is discharging later today. Will continue to follow.

## 2020-10-30 NOTE — PROCEDURES
[address]    PATIENT'S NAME: Nathaniel Valadez   ATTENDING PHYSICIAN: Gabby Khalil. Violeta Ponce DO   OPERATING PHYSICIAN: Natalie Valenzuela M.D.    PATIENT ACCOUNT#:   [de-identified]    LOCATION:  [address] [place] [address]  MEDICAL RECORD #:   [id number]       DATE index was 2.3 L/min/sq m.     Dictated By James Krmaer M.D.  d: 10/30/2020 08:31:20  t: 10/30/2020 09:20:18  [facility] [id number]  [facility]/

## 2020-10-30 NOTE — CM/SW NOTE
Sw met with pt to further discuss recs for [facility]. He states he does not want [facility] at this time. Hoping to dc home today.     Kenzie Lloyd LCSW  /Discharge Planner  [phone]

## 2020-10-30 NOTE — PROGRESS NOTES
[facility] Cardiology Progress Note        Herb Weathers Patient Status:  Observation    1955 MRN [id number]   [facility] 2NE-A Attending Gustavo Yost MD   Hosp Day # 8 PCP Franklin Parmar MD alert and oriented, normal affect. Skin: Warm and dry.            LABS:      HEM:  Recent Labs   Lab 10/24/20  0113 10/27/20  0527 10/28/20  0545 10/29/20  0555   WBC 5.4 3.6* 4.1 4.1   HGB 13.4 14.4 14.2 13.7   HCT 41.1 43.0 43.3 42.5   PLT 93.0* 115.0* 1 sufficient to allow evaluation of LV      diastolic function. The anteroseptum and joan apex was thinned and      akinetic. 2. Aortic valve: Mild regurgitation. 3. Mitral valve: Mildly calcified annulus. There was mild to moderate      regurgitation.

## 2020-10-30 NOTE — PLAN OF CARE
Assumed care of this patient at [address] [place] [address], sitting up in chair. A&O x 4, in no distress. Denies complaints of pain/discomfort, shortness of breath. SBP maintained in the 90's. No acute events overnight. Updated regarding plan of care. Made comfortable.  Will con ADLs to maximize function  - Promote sitting position while performing ADLs such as feeding, grooming, and bathing  - Educate and encourage patient/family in tolerated functional activity level and precautions during self-care    Outcome: Progressing     P

## 2020-10-31 NOTE — PLAN OF CARE
1700 ambulated pt in the hallway with walker standby assist, tolerated fine  Right groin stable  D/c today.

## 2020-11-02 NOTE — PAYOR COMM NOTE
--------------  DISCHARGE REVIEW    Payor: CONNIE OROURKE  Subscriber #:  [id number]  Authorization Number: [id number]    Admit date: 10/22/20  Admit time:  3441  Discharge Date: 10/30/2020  7:31 PM     Admitting Physician: Mele Zendejas MD  Att [id number]  [phone]    Schedule an appointment as soon as possible for a visit          Hospital Course:         Patient is a 60year old male with PMH sig for CAD, ischemic cardiomyopathy EF 10-15%, ho ICD, PVD, SONIDO, HTN, depression who presents with wors hyponatremia resolved    Consults: IP CONSULT TO CARDIOLOGY  IP CONSULT TO HOSPITALIST  CONSULT TO WOUND OSTOMY  IP CONSULT TO OCCUPATIONAL THERAPY  IP CONSULT TO HEART FAILURE COORDINATOR  IP CONSULT TO FOOD AND NUTRITION SERVICES  IP CONSULT TO CARDIAC R Hr          Activity: activity as tolerated  Diet: cardiac diet  Wound Care: as directed  Code Status: Prior      Exam on day of discharge:      10/30/20  1230   BP: 102/65   Pulse: 91   Resp: 18   Temp: 98.3 °F (36.8 °C)       General: no acute distress,

## 2021-04-11 DIAGNOSIS — Z23 NEED FOR VACCINATION: ICD-10-CM

## 2024-10-04 NOTE — PROGRESS NOTES
[facility] Cardiology Progress Note        Renee Gabrielakiran Patient Status:  Observation    1955 MRN [id number]   [facility] 2NE-A Attending Breanne Perry MD   Hosp Day # 7 PCP Carlitos Kimble MD Extremities: no  bilateral LE edema. Erythematous, warm  Neurologic: alert and oriented, normal affect. Skin: Warm and dry. LABS:      HEM:  Recent Labs   Lab 10/23/20  0635 10/24/20  0113 10/27/20  0527 10/28/20  0545 10/29/20  0555   WBC 4. 10/28/20:        1. Left ventricle: The cavity size was mildly increased. Systolic function      was moderately to markedly reduced. The estimated ejection fraction was      30%.  The study is not technically sufficient to allow evaluation of LV      diasto SCDs  SQ heparin

## (undated) NOTE — LETTER
[facility] [address], [address]  Consent for Procedure/Sedation    Date: 10/29/2020    Time: 1530      1. I authorize the performance upon [place] the following: Right heart cath.  I authorize Dr. Petros Quezada (and whom Printed: 10/29/2020   3:24 PM  Patient Name: Fernando Cunha        : 1955       Medical Record #: [id number]